# Patient Record
Sex: FEMALE | Race: WHITE | NOT HISPANIC OR LATINO | Employment: OTHER | URBAN - METROPOLITAN AREA
[De-identification: names, ages, dates, MRNs, and addresses within clinical notes are randomized per-mention and may not be internally consistent; named-entity substitution may affect disease eponyms.]

---

## 2017-01-02 ENCOUNTER — HOSPITAL ENCOUNTER (OUTPATIENT)
Dept: PHYSICAL THERAPY | Facility: HOSPITAL | Age: 52
Setting detail: THERAPIES SERIES
Discharge: HOME | End: 2017-01-02

## 2017-01-02 DIAGNOSIS — Z47.89 ORTHOPEDIC AFTERCARE: Primary | ICD-10-CM

## 2017-01-02 DIAGNOSIS — Z74.09 IMPAIRED MOBILITY: ICD-10-CM

## 2017-01-02 DIAGNOSIS — Z98.890 S/P ROTATOR CUFF REPAIR: ICD-10-CM

## 2017-01-02 PROCEDURE — 97110 THERAPEUTIC EXERCISES: CPT | Performed by: PHYSICAL THERAPIST

## 2017-01-02 PROCEDURE — 97140 MANUAL THERAPY 1/> REGIONS: CPT | Performed by: PHYSICAL THERAPIST

## 2017-01-02 NOTE — PROGRESS NOTES
Outpatient Physical Therapy Ortho Treatment Note  [facility]     Patient Name: Korina Raygoza  : 1965  MRN: [id number]  Today's Date: 2017      Visit Date: 2017    Visit Dx:    ICD-10-CM ICD-9-CM   1. Orthopedic aftercare Z47.89 V54.9   2. S/P rotator cuff repair Z98.890 V45.89   3. Impaired mobility Z74.09 799.89       Patient Active Problem List   Diagnosis   • Tear of medial meniscus of knee   • Osteoarthrosis, localized, primary, knee   • Knee pain, bilateral   • Tendonitis, Achilles, right   • Rotator cuff tendinitis   • Elbow laceration   • Complete tear of left rotator cuff        Past Medical History   Diagnosis Date   • Arthritis      KNEES   • Asthma    • History of herpes zoster    • Meniscus tear      LEFT - CURRENT   • Rosacea    • Rotator cuff tear         Past Surgical History   Procedure Laterality Date   • Mole removal  2013   • Knee surgery     • Shoulder arthroscopy w/ rotator cuff repair Left 10/18/2016     Procedure: SHOULDER ARTHROSCOPY WITH ROTATOR CUFF REPAIR AND LABRAL DEBRIDEMENT;  Surgeon: William Bowman MD;  Location: [facility] OR [facility];  Service:              PT Ortho       17 0800    Left Shoulder    Flexion PROM Deficit 165, supine   -GJ    ABduction PROM Deficit 170, supine   -GJ    External Rotation PROM Deficit 65 supine, POS, 90 abd  -GJ    Internal Rotation PROM Deficit 60, supine, POS, 90 abd   -GJ      User Key  (r) = Recorded By, (t) = Taken By, (c) = Cosigned By    Initials Name Provider Type    MERCY Henao PT Physical Therapist                            PT Assessment/Plan       17 0940          PT Assessment    Assessment Comments Ms. Raygoza returns following a one week vacation.  She is 11 weeks s/p L RC repair.  She reports decreased L shoulder pain following her week off.  She demonstrates good PROM (see specific joints), good scapulo/thoracic rhythm with AAROM activities in standing.  She is progressing well per protocol.     -GJ      PT Plan    PT Plan Comments continue to progress AAROM/AROM, as appropriate.  continue to work on rhythmic stabilization, possible slow reversal.    -GJ        User Key  (r) = Recorded By, (t) = Taken By, (c) = Cosigned By    Initials Name Provider Type    MERCY Henao, PT Physical Therapist                Modalities       01/02/17 0800          Ice    Ice Applied Yes  -GJ      Location L shoulder, pt. seated, LUE rested on pillows in lose pack  -GJ      Rx Minutes 10 mins  -GJ      Ice S/P Rx Yes  -GJ        User Key  (r) = Recorded By, (t) = Taken By, (c) = Cosigned By    Initials Name Provider Type    MERCY Henao, PT Physical Therapist                Exercises       01/02/17 0800          Subjective Comments    Subjective Comments still not sleeping well, and it's not all my shoulder, a lot of it is my legs. The rest on vacation helped my shoulder   -GJ      Subjective Pain    Pre-Treatment Pain Level 1  -GJ      Exercise 1    Exercise Name 1 shoulder shurgs  -GJ      Cueing 1 Verbal  -GJ      Equipment 1 Dumbell  -GJ      Weights/Plates 1 2  -GJ      Reps 1 20  -GJ      Exercise 2    Exercise Name 2 scap retraction, no shoulder ext  -GJ      Cueing 2 Tactile;Verbal;Demo;Auditory  -GJ      Equipment 2 Theraband  -GJ      Resistance 2 Red  -GJ      Reps 2 20  -GJ      Exercise 3    Exercise Name 3 elbow flexion/ext ROM   -GJ      Cueing 3 Verbal  -GJ      Equipment 3 Dumbell  -GJ      Weights/Plates 3 2  -GJ      Reps 3 20  -GJ      Exercise 6    Exercise Name 6 AAROM abduction with cane  -GJ      Cueing 6 Verbal  -GJ      Reps 6 20  -GJ      Exercise 7    Exercise Name 7 wash the wall, both hands  -GJ      Cueing 7 Verbal  -GJ      Reps 7 15  -GJ      Exercise 10    Exercise Name 10 FIR with towel  -GJ      Cueing 10 Verbal  -GJ      Reps 10 5  -GJ      Time (Seconds) 10 10  -GJ      Exercise 11    Exercise Name 11 prone shoulder ext  -GJ      Cueing 11 Verbal  -GJ      Equipment 11 Dumbell   -GJ      Weights/Plates 11 1  -GJ      Sets 11 2  -GJ      Reps 11 10  -GJ      Exercise 12    Exercise Name 12 supine horizontal abduction  -GJ      Cueing 12 Verbal  -GJ      Equipment 12 Theraband  -GJ      Resistance 12 Red  -GJ      Reps 12 15  -GJ      Exercise 13    Exercise Name 13 SL ER, towel roll   -GJ      Cueing 13 Tactile  -GJ      Equipment 13 Dumbell  -GJ      Weights/Plates 13 1  -GJ      Sets 13 2  -GJ      Reps 13 10  -GJ      Exercise 14    Exercise Name 14 prone row to neutral  -GJ      Cueing 14 Tactile  -GJ      Equipment 14 Dumbell  -GJ      Weights/Plates 14 1  -GJ      Reps 14 15  -GJ        User Key  (r) = Recorded By, (t) = Taken By, (c) = Cosigned By    Initials Name Provider Type    MERCY Henao, PT Physical Therapist                        Manual Rx (last 36 hours)      Manual Treatments       01/02/17 0700          Manual Rx 1    Manual Rx 1 Location gentle inferior mobs L shoulder/oscillations for relaxation, pt. supine  -GJ      Manual Rx 2    Manual Rx 2 Location PROM L shoulder, pt. supine, into flexion, abduction, ER, IR   -GJ      Manual Rx 3    Manual Rx 3 Location gentle rhythmic stabilization LUE, flexed to 90  -GJ      Manual Rx 4    Manual Rx 4 Location rhythmic stabilization of ER/IR, POS, pt. supine, varying angles of abduction.   -GJ        User Key  (r) = Recorded By, (t) = Taken By, (c) = Cosigned By    Initials Name Provider Type    MERCY Henao, PT Physical Therapist                PT OP Goals       01/02/17 0800          PT Short Term Goals    STG Date to Achieve 11/29/16  -GJ      STG 1 pt. to be I with initial HEP to facilitate self management of their condition  -GJ      STG 1 Progress Met  -GJ      STG 2 pt. to be educated in/verbalize understanding of the importance of posture/ergonomics in association with their condition to facilitate self management of their condition  -GJ      STG 2 Progress Met  -GJ      STG 3 pt. to demonstrate L shoulder  flexion PROM >/= 0-120 to facilitate ease of performing self care activities  -GJ      STG 3 Progress Met  -GJ      STG 4 pt. to demonstrate L shoulder PROM ER >/= 0-45 to facilitate ease of performing self care activities  -GJ      STG 4 Progress Met  -GJ      STG 5 pt. to report sleeping through the night without interruption secondary to her L shoulder pain to facilitate optimal healing   -GJ      STG 5 Progress Ongoing  -GJ      STG 5 Progress Comments not sleeping through the night  -GJ      Long Term Goals    LTG Date to Achieve 01/11/17  -GJ      LTG 1 pt. to be I with advanced HEP to facilitate self management of their condition  -GJ      LTG 1 Progress Ongoing  -GJ      LTG 2 pt. to report a DASH </= 20% to demonstrate decreased level of perceived disability  -GJ      LTG 2 Progress Ongoing  -GJ      LTG 3 pt. to demonstrate L shoulder AROM FIR >/= mid line belt line to facilitate ease of performing self care/dressing activities  -GJ      LTG 3 Progress Ongoing  -GJ      LTG 4 pt. to demonstrate L shoulder flexion AROM >/= 0-120 to facilitate ease of performing functional/household activities  -GJ      LTG 4 Progress Ongoing  -GJ      LTG 5 pt. to demonstrate placing/retrieving small object from an above the shoulder level shelf with her LUE to facilitate ease of performing household/work related activities  -GJ      LTG 5 Progress Ongoing  -GJ        User Key  (r) = Recorded By, (t) = Taken By, (c) = Cosigned By    Initials Name Provider Type    MERCY Henao, PT Physical Therapist                Therapy Education       01/02/17 0832    Therapy Education    Given HEP;Symptoms/condition management;Posture/body mechanics;Pain management;Mobility training  -GJ    Program New  -GJ    How Provided Verbal;Demonstration;Written  -GJ    Provided to Patient  -GJ    Level of Understanding Verbalized;Teach back education performed;Demonstrated  -GJ      User Key  (r) = Recorded By, (t) = Taken By, (c) = Cosigned  By    Initials Name Provider Type    GJ Gerard Henao, PT Physical Therapist                Time Calculation:   Start Time: 0829  Stop Time: 0925  Time Calculation (min): 56 min    Therapy Charges for Today     Code Description Service Date Service Provider Modifiers Qty    80704815837 HC PT HOT OR COLD PACK TREAT MCARE 1/2/2017 Gerard Henao, PT GP 1    75472769152 HC PT MANUAL THERAPY EA 15 MIN 1/2/2017 Gerard Henao, PT GP 1    75902383112 HC PT THER PROC EA 15 MIN 1/2/2017 Gerard Henao, PT GP 2                    Gerard Henao, PT  1/2/2017

## 2017-01-04 ENCOUNTER — HOSPITAL ENCOUNTER (OUTPATIENT)
Dept: PHYSICAL THERAPY | Facility: HOSPITAL | Age: 52
Setting detail: THERAPIES SERIES
Discharge: HOME | End: 2017-01-04

## 2017-01-04 DIAGNOSIS — Z74.09 IMPAIRED MOBILITY: ICD-10-CM

## 2017-01-04 DIAGNOSIS — M25.661 LIMITATION OF JOINT MOTION OF KNEE, RIGHT: ICD-10-CM

## 2017-01-04 DIAGNOSIS — M17.0 PRIMARY OSTEOARTHRITIS OF BOTH KNEES: ICD-10-CM

## 2017-01-04 DIAGNOSIS — M25.662 KNEE STIFFNESS, LEFT: ICD-10-CM

## 2017-01-04 DIAGNOSIS — Z47.89 ORTHOPEDIC AFTERCARE: Primary | ICD-10-CM

## 2017-01-04 DIAGNOSIS — Z98.890 S/P ROTATOR CUFF REPAIR: ICD-10-CM

## 2017-01-04 PROCEDURE — 97140 MANUAL THERAPY 1/> REGIONS: CPT

## 2017-01-04 PROCEDURE — 97110 THERAPEUTIC EXERCISES: CPT

## 2017-01-04 NOTE — PROGRESS NOTES
Outpatient Physical Therapy Ortho Treatment Note  [facility]     Patient Name: Korina Raygoza  : 1965  MRN: [id number]  Today's Date: 2017      Visit Date: 2017    Visit Dx:    ICD-10-CM ICD-9-CM   1. Orthopedic aftercare Z47.89 V54.9   2. S/P rotator cuff repair Z98.890 V45.89   3. Impaired mobility Z74.09 799.89   4. Knee stiffness, left M25.662 719.56   5. Limitation of joint motion of knee, right M25.661 719.56   6. Primary osteoarthritis of both knees M17.0 715.16       Patient Active Problem List   Diagnosis   • Tear of medial meniscus of knee   • Osteoarthrosis, localized, primary, knee   • Knee pain, bilateral   • Tendonitis, Achilles, right   • Rotator cuff tendinitis   • Elbow laceration   • Complete tear of left rotator cuff        Past Medical History   Diagnosis Date   • Arthritis      KNEES   • Asthma    • History of herpes zoster    • Meniscus tear      LEFT - CURRENT   • Rosacea    • Rotator cuff tear         Past Surgical History   Procedure Laterality Date   • Mole removal  2013   • Knee surgery     • Shoulder arthroscopy w/ rotator cuff repair Left 10/18/2016     Procedure: SHOULDER ARTHROSCOPY WITH ROTATOR CUFF REPAIR AND LABRAL DEBRIDEMENT;  Surgeon: William Bowman MD;  Location: [facility] OR [facility];  Service:              PT Ortho       17 0800    Left Shoulder    Flexion PROM Deficit 165, supine   -GJ    ABduction PROM Deficit 170, supine   -GJ    External Rotation PROM Deficit 65 supine, POS, 90 abd  -GJ    Internal Rotation PROM Deficit 60, supine, POS, 90 abd   -GJ      User Key  (r) = Recorded By, (t) = Taken By, (c) = Cosigned By    Initials Name Provider Type    MERCY Henao PT Physical Therapist                            PT Assessment/Plan       17 0925 17 0940       PT Assessment    Assessment Comments Pain is well controlled. Patient has good PROM. Continue work on functioanl strengthening  -WS Ms. Raygoza returns following a one week  vacation.  She is 11 weeks s/p L RC repair.  She reports decreased L shoulder pain following her week off.  She demonstrates good PROM (see specific joints), good scapulo/thoracic rhythm with AAROM activities in standing.  She is progressing well per protocol.    -GJ     PT Plan    PT Plan Comments  continue to progress AAROM/AROM, as appropriate.  continue to work on rhythmic stabilization, possible slow reversal.    -GJ       User Key  (r) = Recorded By, (t) = Taken By, (c) = Cosigned By    Initials Name Provider Type    MERCY Henao, PT Physical Therapist    WS José Miguel Chairez PTA Physical Therapy Assistant                Modalities       01/04/17 0900          Ice    Location L shoulder, pt. seated, LUE rested on pillows in lose pack  -WS      Rx Minutes 10 mins  -WS      Ice S/P Rx Yes  -WS        User Key  (r) = Recorded By, (t) = Taken By, (c) = Cosigned By    Initials Name Provider Type    ABISAI Chairez PTA Physical Therapy Assistant                Exercises       01/04/17 0845          Subjective Comments    Subjective Comments Pain is low  -WS      Subjective Pain    Able to rate subjective pain? yes  -WS      Pre-Treatment Pain Level 1  -WS      Exercise 1    Exercise Name 1 shoulder shurgs  -WS      Cueing 1 Verbal  -WS      Equipment 1 Dumbell  -WS      Weights/Plates 1 2  -WS      Sets 1 2  -WS      Reps 1 20  -WS      Time (Seconds) 1 5  -WS      Exercise 2    Exercise Name 2 scap retraction, no shoulder ext  -WS      Cueing 2 Tactile;Verbal;Demo;Auditory  -WS      Equipment 2 Theraband  -WS      Resistance 2 Red  -WS      Reps 2 20  -WS      Time (Seconds) 2 30  -WS      Exercise 3    Exercise Name 3 elbow flexion/ext ROM   -WS      Cueing 3 Verbal  -WS      Equipment 3 Dumbell  -WS      Weights/Plates 3 2  -WS      Reps 3 20  -WS      Exercise 6    Exercise Name 6 AAROM abduction with cane  -WS      Cueing 6 Verbal  -WS      Reps 6 20  -WS      Exercise 7    Exercise Name 7 wash the  wall, both hands  -WS      Cueing 7 Verbal  -WS      Sets 7 1  -WS      Reps 7 15  -WS      Time (Seconds) 7 5  -WS      Exercise 8    Exercise Name 8 chest press, cane, supine  -WS      Cueing 8 Verbal  -WS      Equipment 8 Cuff Weight  -WS      Weights/Plates 8 5  -WS      Sets 8 1  -WS      Reps 8 15  -WS      Exercise 10    Exercise Name 10 FIR with towel  -WS      Cueing 10 Verbal  -WS      Reps 10 5  -WS      Time (Seconds) 10 10  -WS      Exercise 11    Exercise Name 11 prone shoulder ext  -WS      Cueing 11 Verbal  -WS      Equipment 11 Dumbell  -WS      Weights/Plates 11 1  -WS      Sets 11 2  -WS      Reps 11 10  -WS      Exercise 12    Exercise Name 12 supine horizontal abduction  -WS      Cueing 12 Verbal  -WS      Equipment 12 Theraband  -WS      Resistance 12 Red  -WS      Reps 12 15  -WS      Exercise 13    Exercise Name 13 SL ER, towel roll   -WS      Cueing 13 Tactile  -WS      Equipment 13 Dumbell  -WS      Weights/Plates 13 1  -WS      Sets 13 2  -WS      Reps 13 10  -WS      Exercise 14    Exercise Name 14 prone row to neutral  -WS      Cueing 14 Tactile  -WS      Equipment 14 Dumbell  -WS      Weights/Plates 14 1  -WS      Reps 14 15  -WS        User Key  (r) = Recorded By, (t) = Taken By, (c) = Cosigned By    Initials Name Provider Type     José Miguel Chairez PTA Physical Therapy Assistant                        Manual Rx (last 36 hours)      Manual Treatments       01/04/17 0900          Manual Rx 1    Manual Rx 1 Location gentle inferior mobs L shoulder/oscillations for relaxation, pt. supine  -WS      Manual Rx 2    Manual Rx 2 Location PROM L shoulder, pt. supine, into flexion, abduction, ER, IR   -WS      Manual Rx 3    Manual Rx 3 Location gentle rhythmic stabilization LUE, flexed to 90  -WS      Manual Rx 4    Manual Rx 4 Location rhythmic stabilization of ER/IR, POS, pt. supine, varying angles of abduction.   -WS        User Key  (r) = Recorded By, (t) = Taken By, (c) = Cosigned By     Initials Name Provider Type    WS José Miguel Chairez, PTA Physical Therapy Assistant                PT OP Goals       01/04/17 0900 01/02/17 0800       PT Short Term Goals    STG Date to Achieve 11/29/16  -WS 11/29/16  -GJ     STG 1 pt. to be I with initial HEP to facilitate self management of their condition  -WS pt. to be I with initial HEP to facilitate self management of their condition  -GJ     STG 1 Progress Met  -WS Met  -GJ     STG 2 pt. to be educated in/verbalize understanding of the importance of posture/ergonomics in association with their condition to facilitate self management of their condition  -WS pt. to be educated in/verbalize understanding of the importance of posture/ergonomics in association with their condition to facilitate self management of their condition  -GJ     STG 2 Progress Met  -WS Met  -GJ     STG 3 pt. to demonstrate L shoulder flexion PROM >/= 0-120 to facilitate ease of performing self care activities  -WS pt. to demonstrate L shoulder flexion PROM >/= 0-120 to facilitate ease of performing self care activities  -GJ     STG 3 Progress Met  -WS Met  -GJ     STG 4 pt. to demonstrate L shoulder PROM ER >/= 0-45 to facilitate ease of performing self care activities  -WS pt. to demonstrate L shoulder PROM ER >/= 0-45 to facilitate ease of performing self care activities  -GJ     STG 4 Progress Met  -WS Met  -GJ     STG 5 pt. to report sleeping through the night without interruption secondary to her L shoulder pain to facilitate optimal healing   -WS pt. to report sleeping through the night without interruption secondary to her L shoulder pain to facilitate optimal healing   -GJ     STG 5 Progress Ongoing  -WS Ongoing  -GJ     STG 5 Progress Comments  not sleeping through the night  -GJ     Long Term Goals    LTG Date to Achieve 01/11/17  -WS 01/11/17  -GJ     LTG 1 pt. to be I with advanced HEP to facilitate self management of their condition  -WS pt. to be I with advanced HEP to  facilitate self management of their condition  -GJ     LTG 1 Progress Ongoing  -WS Ongoing  -GJ     LTG 2 pt. to report a DASH </= 20% to demonstrate decreased level of perceived disability  -WS pt. to report a DASH </= 20% to demonstrate decreased level of perceived disability  -GJ     LTG 2 Progress Ongoing  -WS Ongoing  -GJ     LTG 3 pt. to demonstrate L shoulder AROM FIR >/= mid line belt line to facilitate ease of performing self care/dressing activities  -WS pt. to demonstrate L shoulder AROM FIR >/= mid line belt line to facilitate ease of performing self care/dressing activities  -GJ     LTG 3 Progress Ongoing  -WS Ongoing  -GJ     LTG 4 pt. to demonstrate L shoulder flexion AROM >/= 0-120 to facilitate ease of performing functional/household activities  -WS pt. to demonstrate L shoulder flexion AROM >/= 0-120 to facilitate ease of performing functional/household activities  -GJ     LTG 4 Progress Ongoing  -WS Ongoing  -GJ     LTG 5 pt. to demonstrate placing/retrieving small object from an above the shoulder level shelf with her LUE to facilitate ease of performing household/work related activities  -WS pt. to demonstrate placing/retrieving small object from an above the shoulder level shelf with her LUE to facilitate ease of performing household/work related activities  -GJ     LTG 5 Progress Ongoing  -WS Ongoing  -GJ     LTG 6 pt. to report initiating personal fitness program at [facility] to faciliate optimal carry over and improved overall health  -WS      LTG 6 Progress Met  -WS        User Key  (r) = Recorded By, (t) = Taken By, (c) = Cosigned By    Initials Name Provider Type    MERCY Henao, PT Physical Therapist    ABISAI Chairez PTA Physical Therapy Assistant                Therapy Education       01/04/17 0933    Therapy Education    Given HEP  -WS    Program Reinforced  -WS    How Provided Verbal  -WS    Provided to Patient  -WS      01/02/17 0832    Therapy Education    Given  HEP;Symptoms/condition management;Posture/body mechanics;Pain management;Mobility training  -GJ    Program New  -GJ    How Provided Verbal;Demonstration;Written  -GJ    Provided to Patient  -GJ    Level of Understanding Verbalized;Teach back education performed;Demonstrated  -GJ      User Key  (r) = Recorded By, (t) = Taken By, (c) = Cosigned By    Initials Name Provider Type    MERCY Henao, PT Physical Therapist    ABISAI Chairez PTA Physical Therapy Assistant                Time Calculation:   Start Time: 0845  Stop Time: 0945  Time Calculation (min): 60 min    Therapy Charges for Today     Code Description Service Date Service Provider Modifiers Qty    86642585630 HC PT THER PROC EA 15 MIN 1/4/2017 José Miguel Chairez PTA GP 2    93199624276 HC PT HOT OR COLD PACK TREAT MCARE 1/4/2017 José Miguel Chairez PTA GP 1    83499066371 HC PT MANUAL THERAPY EA 15 MIN 1/4/2017 José Miguel Chairez PTA GP 1                    José Miguel Chairez PTA  1/4/2017

## 2017-01-09 ENCOUNTER — APPOINTMENT (OUTPATIENT)
Dept: PHYSICAL THERAPY | Facility: HOSPITAL | Age: 52
End: 2017-01-09

## 2017-01-11 ENCOUNTER — HOSPITAL ENCOUNTER (OUTPATIENT)
Dept: PHYSICAL THERAPY | Facility: HOSPITAL | Age: 52
Setting detail: THERAPIES SERIES
Discharge: HOME | End: 2017-01-11

## 2017-01-11 ENCOUNTER — OFFICE VISIT (OUTPATIENT)
Dept: ORTHOPEDIC SURGERY | Facility: CLINIC | Age: 52
End: 2017-01-11

## 2017-01-11 VITALS — BODY MASS INDEX: 33.74 KG/M2 | WEIGHT: 215 LBS | HEIGHT: 67 IN | TEMPERATURE: 98.2 F

## 2017-01-11 DIAGNOSIS — Z74.09 IMPAIRED MOBILITY: ICD-10-CM

## 2017-01-11 DIAGNOSIS — M17.0 PRIMARY OSTEOARTHRITIS OF BOTH KNEES: ICD-10-CM

## 2017-01-11 DIAGNOSIS — Z47.89 ORTHOPEDIC AFTERCARE: Primary | ICD-10-CM

## 2017-01-11 DIAGNOSIS — M25.661 LIMITATION OF JOINT MOTION OF KNEE, RIGHT: ICD-10-CM

## 2017-01-11 DIAGNOSIS — Z98.890 S/P ROTATOR CUFF REPAIR: Primary | ICD-10-CM

## 2017-01-11 DIAGNOSIS — M25.662 KNEE STIFFNESS, LEFT: ICD-10-CM

## 2017-01-11 DIAGNOSIS — Z98.890 S/P ROTATOR CUFF REPAIR: ICD-10-CM

## 2017-01-11 PROCEDURE — 97140 MANUAL THERAPY 1/> REGIONS: CPT

## 2017-01-11 PROCEDURE — 97110 THERAPEUTIC EXERCISES: CPT

## 2017-01-11 PROCEDURE — 99024 POSTOP FOLLOW-UP VISIT: CPT | Performed by: ORTHOPAEDIC SURGERY

## 2017-01-11 RX ORDER — OXYCODONE AND ACETAMINOPHEN 7.5; 325 MG/1; MG/1
1 TABLET ORAL EVERY 8 HOURS PRN
Qty: 50 TABLET | Refills: 0 | Status: SHIPPED | OUTPATIENT
Start: 2017-01-11 | End: 2017-04-14

## 2017-01-11 NOTE — MR AVS SNAPSHOT
Korina Raygoza   1/11/2017 8:00 AM   Office Visit    Dept Phone:  [phone]   Encounter #:  [id number]    Provider:  William Bowman MD   Department:  [facility]                Your Full Care Plan              Today's Medication Changes          These changes are accurate as of: 1/11/17  8:37 AM.  If you have any questions, ask your nurse or doctor.               Medication(s)that have changed:     promethazine 12.5 MG tablet   Commonly known as:  PHENERGAN   Take 1 tablet by mouth Every 6 (Six) Hours As Needed for nausea or vomiting.   What changed:  Another medication with the same name was removed. Continue taking this medication, and follow the directions you see here.   Changed by:  William Bowman MD            Where to Get Your Medications      You can get these medications from any pharmacy     Bring a paper prescription for each of these medications     oxyCODONE-acetaminophen 7.5-325 MG per tablet                  Your Updated Medication List          This list is accurate as of: 1/11/17  8:37 AM.  Always use your most recent med list.                albuterol 108 (90 BASE) MCG/ACT inhaler   Commonly known as:  PROVENTIL HFA;VENTOLIN HFA       metroNIDAZOLE 0.75 % cream   Commonly known as:  METROCREAM       MULTIVITAMIN ADULT PO       ondansetron ODT 4 MG disintegrating tablet   Commonly known as:  ZOFRAN ODT   Take 1 tablet by mouth every 8 (eight) hours as needed for nausea or vomiting.       * oxyCODONE-acetaminophen 7.5-325 MG per tablet   Commonly known as:  PERCOCET   Take 1 tablet by mouth Every 4 (Four) Hours As Needed for moderate pain (4-6).       * oxyCODONE-acetaminophen 7.5-325 MG per tablet   Commonly known as:  PERCOCET   Take 1 tablet by mouth Every 8 (Eight) Hours As Needed for moderate pain (4-6).       promethazine 12.5 MG tablet   Commonly known as:  PHENERGAN   Take 1 tablet by mouth Every 6 (Six)  Hours As Needed for nausea or vomiting.       valACYclovir 500 MG tablet   Commonly known as:  VALTREX       vitamin D 00652 UNITS capsule capsule   Commonly known as:  ERGOCALCIFEROL       * Notice:  This list has 2 medication(s) that are the same as other medications prescribed for you. Read the directions carefully, and ask your doctor or other care provider to review them with you.            You Were Diagnosed With        Codes Comments    S/P rotator cuff repair    -  Primary ICD-10-CM: Z98.890  ICD-9-CM: V45.89       Instructions     None    Patient Instructions History      Upcoming Appointments     Visit Type Date Time Department    FOLLOW UP 1/11/2017  8:00 AM [facility]    TREATMENT 1/11/2017  9:00 AM [facility]    TREATMENT 1/16/2017  8:00 AM  [facility]    FOLLOW UP 2/17/2017 10:50 AM [facility]      MyChart Signup     Our records indicate that you have an active [employer] account.    You can view your After Visit Summary by going to [employer] and logging in with your [employer] username and password.  If you don't have a [employer] username and password but a parent or guardian has access to your record, the parent or guardian should login with their own [employer] username and password and access your record to view the After Visit Summary.    If you have questions, you can email [email or web address] or call [phone] to talk to our [employer] staff.  Remember, [employer] is NOT to be used for urgent needs.  For medical emergencies, dial 911.               Other Info from Your Visit           Your Appointments     Jan 11, 2017  9:00 AM EST   Therapy Treatment with José Miguel Chairez [facility] ([facility]    [address] [facility] [address]   [phone]            Jan 16, 2017  8:00 AM EST   Therapy Treatment with José Miguel Chairez PTA   [facility] ([place])  "   [address] [facility] [address]   [phone]            Feb 17, 2017 10:50 AM EST   Follow Up with William Bowman MD   [facility] (--)    [address] [facility] [address]  [facility] [address]   [phone]           Arrive 15 minutes prior to appointment.              Allergies     Codeine  Nausea Only      Reason for Visit     Left Shoulder - Follow-up, Pain           Vital Signs     Temperature Height Weight Body Mass Index Smoking Status       98.2 °F (36.8 °C) 67\" (170.2 cm) 215 lb (97.5 kg) 33.67 kg/m2 Never Smoker       Problems and Diagnoses Noted     S/P rotator cuff repair    -  Primary        "

## 2017-01-11 NOTE — PROGRESS NOTES
Korina Raygoza : 1965 MRN: [id number] DATE: 2017      CC: 3 months s/p left shoulder rotator cuff repair    HPI: Pt. returns to clinic today stating pain is improved.  Still has soreness but it's slowly getting better.  Motion is progressing.  Denies any new concerns or issues.  PT is helping.    Vitals:    17 0804   Temp: 98.2 °F (36.8 °C)       Current Outpatient Prescriptions:   •  albuterol (PROVENTIL HFA;VENTOLIN HFA) 108 (90 BASE) MCG/ACT inhaler, Inhale 2 puffs Every 6 (Six) Hours As Needed for wheezing or shortness of air., Disp: , Rfl:   •  metroNIDAZOLE (METROCREAM) 0.75 % cream, Apply  topically Daily As Needed., Disp: , Rfl:   •  Multiple Vitamins-Minerals (MULTIVITAMIN ADULT PO), Take 1 tablet by mouth Daily., Disp: , Rfl:   •  ondansetron ODT (ZOFRAN ODT) 4 MG disintegrating tablet, Take 1 tablet by mouth every 8 (eight) hours as needed for nausea or vomiting., Disp: 10 tablet, Rfl: 0  •  oxyCODONE-acetaminophen (PERCOCET) 7.5-325 MG per tablet, Take 1 tablet by mouth Every 4 (Four) Hours As Needed for moderate pain (4-6)., Disp: 50 tablet, Rfl: 0  •  promethazine (PHENERGAN) 12.5 MG tablet, Take 1 tablet by mouth Every 6 (Six) Hours As Needed for nausea or vomiting., Disp: 30 tablet, Rfl: 0  •  valACYclovir (VALTREX) 500 MG tablet, Take 500 mg by mouth As Needed., Disp: , Rfl:   •  vitamin D (ERGOCALCIFEROL) 11838 UNITS capsule capsule, Take 50,000 Units by mouth Every 7 (Seven) Days., Disp: , Rfl:     Past Medical History   Diagnosis Date   • Arthritis      KNEES   • Asthma    • History of herpes zoster    • Meniscus tear      LEFT - CURRENT   • Rosacea    • Rotator cuff tear        Past Surgical History   Procedure Laterality Date   • Mole removal  2013   • Knee surgery     • Shoulder arthroscopy w/ rotator cuff repair Left 10/18/2016     Procedure: SHOULDER ARTHROSCOPY WITH ROTATOR CUFF REPAIR AND LABRAL DEBRIDEMENT;  Surgeon: William Bowman MD;  Location: [facility] OR [facility] [place];   Service:        Family History   Problem Relation Age of Onset   • Hypertension Other    • Bleeding Disorder Other    • Lung cancer Other    • Heart disease Other    • Diabetes Other        Social History     Social History   • Marital status:      Spouse name: N/A   • Number of children: N/A   • Years of education: N/A     Occupational History   • Not on file.     Social History Main Topics   • Smoking status: Never Smoker   • Smokeless tobacco: Never Used   • Alcohol use No   • Drug use: No   • Sexual activity: No     Other Topics Concern   • Not on file     Social History Narrative     Exam:  Contour of shoulder appears normal.  Skin intact and benign.  Arm and forearm soft.  Motion is excellent--still lacking several levels of IR.  Good motor and sensory function distally.  Palpable pulses with good cap refill.      Imaging:  none    Impression:  3 months s/p left shoulder rotator cuff repair    Plan:    1.  Progress ROM and strengthening as tolerated.  2.  Continue PT per protocol.  3.  F/u in 6 weeks.  4.  I have agreed to refill her pain medication.  The risks were discussed in detail.

## 2017-01-11 NOTE — PROGRESS NOTES
Outpatient Physical Therapy Ortho Treatment Note  [facility]     Patient Name: Korina Raygoza  : 1965  MRN: [id number]  Today's Date: 2017      Visit Date: 2017    Visit Dx:    ICD-10-CM ICD-9-CM   1. Orthopedic aftercare Z47.89 V54.9   2. S/P rotator cuff repair Z98.890 V45.89   3. Impaired mobility Z74.09 799.89   4. Knee stiffness, left M25.662 719.56   5. Limitation of joint motion of knee, right M25.661 719.56   6. Primary osteoarthritis of both knees M17.0 715.16       Patient Active Problem List   Diagnosis   • Tear of medial meniscus of knee   • Osteoarthrosis, localized, primary, knee   • Knee pain, bilateral   • Tendonitis, Achilles, right   • Rotator cuff tendinitis   • Elbow laceration   • Complete tear of left rotator cuff        Past Medical History   Diagnosis Date   • Arthritis      KNEES   • Asthma    • History of herpes zoster    • Meniscus tear      LEFT - CURRENT   • Rosacea    • Rotator cuff tear         Past Surgical History   Procedure Laterality Date   • Mole removal  2013   • Knee surgery     • Shoulder arthroscopy w/ rotator cuff repair Left 10/18/2016     Procedure: SHOULDER ARTHROSCOPY WITH ROTATOR CUFF REPAIR AND LABRAL DEBRIDEMENT;  Surgeon: William Bowman MD;  Location: [facility] OR [facility] [place];  Service:                              PT Assessment/Plan       17 0938          PT Assessment    Assessment Comments Progressing well with exercises. Increased weight with S/L ER  -WS        User Key  (r) = Recorded By, (t) = Taken By, (c) = Cosigned By    Initials Name Provider Type    ABISAI Chairez PTA Physical Therapy Assistant                    Exercises       17 0845          Subjective Comments    Subjective Comments MD visit went well  -WS      Subjective Pain    Able to rate subjective pain? yes  -WS      Pre-Treatment Pain Level 1  -WS      Exercise 1    Exercise Name 1 shoulder shurgs  -WS      Cueing 1 Verbal  -WS      Equipment 1 Dumbell   -WS      Weights/Plates 1 2  -WS      Reps 1 20  -WS      Exercise 2    Exercise Name 2 scap retraction, no shoulder ext  -WS      Cueing 2 Tactile;Verbal;Demo;Auditory  -WS      Equipment 2 Theraband  -WS      Resistance 2 Red  -WS      Reps 2 20  -WS      Exercise 3    Exercise Name 3 elbow flexion/ext ROM   -WS      Cueing 3 Verbal  -WS      Equipment 3 Dumbell  -WS      Weights/Plates 3 2  -WS      Reps 3 20  -WS      Exercise 7    Exercise Name 7 wash the wall, both hands  -WS      Cueing 7 Verbal  -WS      Sets 7 1  -WS      Reps 7 15  -WS      Exercise 8    Exercise Name 8 chest press, cane, supine  -WS      Cueing 8 Verbal  -WS      Equipment 8 Cuff Weight  -WS      Weights/Plates 8 5  -WS      Sets 8 1  -WS      Reps 8 15  -WS      Exercise 10    Exercise Name 10 FIR with towel  -WS      Cueing 10 Verbal  -WS      Sets 10 1  -WS      Reps 10 5  -WS      Time (Seconds) 10 10  -WS      Exercise 11    Exercise Name 11 prone shoulder ext  -WS      Cueing 11 Verbal  -WS      Equipment 11 Dumbell  -WS      Weights/Plates 11 1  -WS      Sets 11 2  -WS      Reps 11 10  -WS      Exercise 12    Exercise Name 12 supine horizontal abduction  -WS      Cueing 12 Verbal  -WS      Equipment 12 Theraband  -WS      Resistance 12 Red  -WS      Reps 12 15  -WS      Exercise 13    Exercise Name 13 SL ER, towel roll   -WS      Cueing 13 Tactile  -WS      Equipment 13 Dumbell  -WS      Weights/Plates 13 2 Plates  -WS      Sets 13 2  -WS      Reps 13 10  -WS      Exercise 14    Exercise Name 14 prone row to neutral  -WS      Cueing 14 Tactile  -WS      Equipment 14 Dumbell  -WS      Weights/Plates 14 1  -WS      Reps 14 15  -WS        User Key  (r) = Recorded By, (t) = Taken By, (c) = Cosigned By    Initials Name Provider Type    ABISAI Chairez PTA Physical Therapy Assistant                               PT OP Goals       01/11/17 0900 01/04/17 0900 01/02/17 0800    PT Short Term Goals    STG Date to Achieve 11/29/16  -WS  11/29/16  -WS 11/29/16  -GJ    STG 1 pt. to be I with initial HEP to facilitate self management of their condition  -WS pt. to be I with initial HEP to facilitate self management of their condition  -WS pt. to be I with initial HEP to facilitate self management of their condition  -GJ    STG 1 Progress Met  -WS Met  -WS Met  -GJ    STG 2 pt. to be educated in/verbalize understanding of the importance of posture/ergonomics in association with their condition to facilitate self management of their condition  -WS pt. to be educated in/verbalize understanding of the importance of posture/ergonomics in association with their condition to facilitate self management of their condition  -WS pt. to be educated in/verbalize understanding of the importance of posture/ergonomics in association with their condition to facilitate self management of their condition  -GJ    STG 2 Progress Met  -WS Met  -WS Met  -GJ    STG 3 pt. to demonstrate L shoulder flexion PROM >/= 0-120 to facilitate ease of performing self care activities  -WS pt. to demonstrate L shoulder flexion PROM >/= 0-120 to facilitate ease of performing self care activities  -WS pt. to demonstrate L shoulder flexion PROM >/= 0-120 to facilitate ease of performing self care activities  -GJ    STG 3 Progress Met  -WS Met  -WS Met  -GJ    STG 4 pt. to demonstrate L shoulder PROM ER >/= 0-45 to facilitate ease of performing self care activities  -WS pt. to demonstrate L shoulder PROM ER >/= 0-45 to facilitate ease of performing self care activities  -WS pt. to demonstrate L shoulder PROM ER >/= 0-45 to facilitate ease of performing self care activities  -GJ    STG 4 Progress Met  -WS Met  -WS Met  -GJ    STG 5 pt. to report sleeping through the night without interruption secondary to her L shoulder pain to facilitate optimal healing   -WS pt. to report sleeping through the night without interruption secondary to her L shoulder pain to facilitate optimal healing   -WS pt.  to report sleeping through the night without interruption secondary to her L shoulder pain to facilitate optimal healing   -GJ    STG 5 Progress Ongoing  -WS Ongoing  -WS Ongoing  -GJ    STG 5 Progress Comments   not sleeping through the night  -GJ    Long Term Goals    LTG Date to Achieve 01/11/17  -WS 01/11/17  -WS 01/11/17  -GJ    LTG 1 pt. to be I with advanced HEP to facilitate self management of their condition  -WS pt. to be I with advanced HEP to facilitate self management of their condition  -WS pt. to be I with advanced HEP to facilitate self management of their condition  -GJ    LTG 1 Progress Ongoing  -WS Ongoing  -WS Ongoing  -GJ    LTG 2 pt. to report a DASH </= 20% to demonstrate decreased level of perceived disability  -WS pt. to report a DASH </= 20% to demonstrate decreased level of perceived disability  -WS pt. to report a DASH </= 20% to demonstrate decreased level of perceived disability  -GJ    LTG 2 Progress Ongoing  -WS Ongoing  -WS Ongoing  -GJ    LTG 3 pt. to demonstrate L shoulder AROM FIR >/= mid line belt line to facilitate ease of performing self care/dressing activities  -WS pt. to demonstrate L shoulder AROM FIR >/= mid line belt line to facilitate ease of performing self care/dressing activities  -WS pt. to demonstrate L shoulder AROM FIR >/= mid line belt line to facilitate ease of performing self care/dressing activities  -GJ    LTG 3 Progress Ongoing  -WS Ongoing  -WS Ongoing  -GJ    LTG 4 pt. to demonstrate L shoulder flexion AROM >/= 0-120 to facilitate ease of performing functional/household activities  -WS pt. to demonstrate L shoulder flexion AROM >/= 0-120 to facilitate ease of performing functional/household activities  -WS pt. to demonstrate L shoulder flexion AROM >/= 0-120 to facilitate ease of performing functional/household activities  -GJ    LTG 4 Progress Ongoing  -WS Ongoing  -WS Ongoing  -GJ    LTG 5 pt. to demonstrate placing/retrieving small object from an above  the shoulder level shelf with her LUE to facilitate ease of performing household/work related activities  -WS pt. to demonstrate placing/retrieving small object from an above the shoulder level shelf with her LUE to facilitate ease of performing household/work related activities  -WS pt. to demonstrate placing/retrieving small object from an above the shoulder level shelf with her LUE to facilitate ease of performing household/work related activities  -GJ    LTG 5 Progress Ongoing  -WS Ongoing  -WS Ongoing  -GJ    LTG 6 pt. to report initiating personal fitness program at [facility] to faciliate optimal carry over and improved overall health  -WS pt. to report initiating personal fitness program at [facility] to faciliate optimal carry over and improved overall health  -WS     LTG 6 Progress Met  -WS Met  -WS       User Key  (r) = Recorded By, (t) = Taken By, (c) = Cosigned By    Initials Name Provider Type    MERCY Henao, PT Physical Therapist    ABISAI Chairez PTA Physical Therapy Assistant                Therapy Education       01/11/17 0936    Therapy Education    Given Posture/body mechanics;HEP  -WS    Program Reinforced  -WS    How Provided Verbal  -WS    Provided to Patient  -WS      User Key  (r) = Recorded By, (t) = Taken By, (c) = Cosigned By    Initials Name Provider Type    ABISAI Chairez PTA Physical Therapy Assistant                Time Calculation:   Start Time: 0845  Stop Time: 0945  Time Calculation (min): 60 min    Therapy Charges for Today     Code Description Service Date Service Provider Modifiers Qty    95721573658 HC PT THER PROC EA 15 MIN 1/11/2017 José Miguel Cahirez PTA GP 2    34030719093 HC PT MANUAL THERAPY EA 15 MIN 1/11/2017 José Miguel Chairez PTA GP 1    72679327388 HC PT HOT OR COLD PACK TREAT [facility] 1/11/2017 José Miguel Chairez PTA GP 1                    José Miguel Chairez PTA  1/11/2017

## 2017-01-13 ENCOUNTER — APPOINTMENT (OUTPATIENT)
Dept: PHYSICAL THERAPY | Facility: HOSPITAL | Age: 52
End: 2017-01-13

## 2017-01-16 ENCOUNTER — HOSPITAL ENCOUNTER (OUTPATIENT)
Dept: PHYSICAL THERAPY | Facility: HOSPITAL | Age: 52
Setting detail: THERAPIES SERIES
Discharge: HOME | End: 2017-01-16

## 2017-01-16 DIAGNOSIS — Z47.89 ORTHOPEDIC AFTERCARE: Primary | ICD-10-CM

## 2017-01-16 DIAGNOSIS — Z98.890 S/P ROTATOR CUFF REPAIR: ICD-10-CM

## 2017-01-16 DIAGNOSIS — M25.662 KNEE STIFFNESS, LEFT: ICD-10-CM

## 2017-01-16 DIAGNOSIS — M25.661 LIMITATION OF JOINT MOTION OF KNEE, RIGHT: ICD-10-CM

## 2017-01-16 DIAGNOSIS — M17.0 PRIMARY OSTEOARTHRITIS OF BOTH KNEES: ICD-10-CM

## 2017-01-16 DIAGNOSIS — Z74.09 IMPAIRED MOBILITY: ICD-10-CM

## 2017-01-16 PROCEDURE — 97110 THERAPEUTIC EXERCISES: CPT

## 2017-01-16 PROCEDURE — 97140 MANUAL THERAPY 1/> REGIONS: CPT

## 2017-01-16 NOTE — PROGRESS NOTES
Outpatient Physical Therapy Ortho Treatment Note  [facility]     Patient Name: Korina Raygoza  : 1965  MRN: [id number]  Today's Date: 2017      Visit Date: 2017    Visit Dx:    ICD-10-CM ICD-9-CM   1. Orthopedic aftercare Z47.89 V54.9   2. S/P rotator cuff repair Z98.890 V45.89   3. Impaired mobility Z74.09 799.89   4. Knee stiffness, left M25.662 719.56   5. Limitation of joint motion of knee, right M25.661 719.56   6. Primary osteoarthritis of both knees M17.0 715.16       Patient Active Problem List   Diagnosis   • Tear of medial meniscus of knee   • Osteoarthrosis, localized, primary, knee   • Knee pain, bilateral   • Tendonitis, Achilles, right   • Rotator cuff tendinitis   • Elbow laceration   • Complete tear of left rotator cuff        Past Medical History   Diagnosis Date   • Arthritis      KNEES   • Asthma    • History of herpes zoster    • Meniscus tear      LEFT - CURRENT   • Rosacea    • Rotator cuff tear         Past Surgical History   Procedure Laterality Date   • Mole removal  2013   • Knee surgery     • Shoulder arthroscopy w/ rotator cuff repair Left 10/18/2016     Procedure: SHOULDER ARTHROSCOPY WITH ROTATOR CUFF REPAIR AND LABRAL DEBRIDEMENT;  Surgeon: William Bowman MD;  Location: [facility] OR [facility];  Service:                              PT Assessment/Plan       17 [phone] 17 [phone]       PT Assessment    Assessment Comments Shoulder strength slowly improving. Patient continues to have difficulty with bed mobility aby shoulders. Continue to have sleep disruptions  -WS Progressing well with exercises. Increased weight with S/L ER  -WS       User Key  (r) = Recorded By, (t) = Taken By, (c) = Cosigned By    Initials Name Provider Type    WS José Miguel Chairez PTA Physical Therapy Assistant                Modalities       17 0800          Ice    Ice Applied Yes  -WS      Location L shoulder, pt. seated, LUE rested on pillows in lose pack  -WS      Rx Minutes 10  mins  -WS      Ice S/P Rx Yes  -WS        User Key  (r) = Recorded By, (t) = Taken By, (c) = Cosigned By    Initials Name Provider Type    WS José Miguel Chairez PTA Physical Therapy Assistant                Exercises       01/16/17 0800          Subjective Comments    Subjective Comments Shoulder is just sore  -WS      Subjective Pain    Able to rate subjective pain? yes  -WS      Pre-Treatment Pain Level 1  -WS      Exercise 1    Exercise Name 1 shoulder shurgs  -WS      Cueing 1 Verbal  -WS      Equipment 1 Dumbell  -WS      Weights/Plates 1 2  -WS      Reps 1 20  -WS      Exercise 2    Exercise Name 2 scap retraction, no shoulder ext  -WS      Cueing 2 Tactile;Verbal;Demo;Auditory  -WS      Equipment 2 Theraband  -WS      Resistance 2 Green  -WS      Reps 2 20  -WS      Exercise 3    Exercise Name 3 elbow flexion/ext ROM   -WS      Cueing 3 Verbal  -WS      Equipment 3 Dumbell  -WS      Weights/Plates 3 2  -WS      Reps 3 20  -WS      Exercise 5    Weights/Plates 5 --   7  -WS      Exercise 6    Exercise Name 6 AAROM abduction with cane  -WS      Cueing 6 Verbal  -WS      Reps 6 20  -WS      Time (Minutes) 6 2  -WS      Time (Seconds) 6 20  -WS      Exercise 7    Exercise Name 7 wash the wall, both hands  -WS      Cueing 7 Verbal  -WS      Sets 7 1  -WS      Reps 7 15  -WS      Time (Seconds) 7 5  -WS      Exercise 8    Exercise Name 8 chest press, cane, supine  -WS      Cueing 8 Verbal  -WS      Equipment 8 Cuff Weight  -WS      Weights/Plates 8 5  -WS      Sets 8 1  -WS      Reps 8 15  -WS      Exercise 10    Exercise Name 10 FIR with towel  -WS      Cueing 10 Verbal  -WS      Sets 10 1  -WS      Reps 10 5  -WS      Time (Seconds) 10 10  -WS      Exercise 11    Exercise Name 11 prone shoulder ext  -WS      Cueing 11 Verbal  -WS      Equipment 11 Dumbell  -WS      Weights/Plates 11 2  -WS      Sets 11 2  -WS      Reps 11 10  -WS      Exercise 12    Exercise Name 12 supine horizontal abduction  -WS      Cueing 12  Verbal  -WS      Equipment 12 Theraband  -WS      Resistance 12 Red  -WS      Reps 12 15  -WS      Exercise 13    Exercise Name 13 SL ER, towel roll   -WS      Cueing 13 Tactile  -WS      Equipment 13 Dumbell  -WS      Weights/Plates 13 2  -WS      Sets 13 2  -WS      Reps 13 10  -WS      Exercise 14    Exercise Name 14 prone row to neutral  -WS      Cueing 14 Tactile  -WS      Equipment 14 Dumbell  -WS      Weights/Plates 14 2  -WS      Reps 14 15  -WS        User Key  (r) = Recorded By, (t) = Taken By, (c) = Cosigned By    Initials Name Provider Type    ABISAI Chairez PTA Physical Therapy Assistant                        Manual Rx (last 36 hours)      Manual Treatments       01/16/17 0843          Manual Rx 1    Manual Rx 1 Location gentle inferior mobs L shoulder/oscillations for relaxation, pt. supine  -WS      Manual Rx 2    Manual Rx 2 Location PROM L shoulder, pt. supine, into flexion, abduction, ER, IR   -WS      Manual Rx 3    Manual Rx 3 Location gentle rhythmic stabilization LUE, flexed to 90  -WS      Manual Rx 4    Manual Rx 4 Location rhythmic stabilization of ER/IR, POS, pt. supine, varying angles of abduction.   -WS        User Key  (r) = Recorded By, (t) = Taken By, (c) = Cosigned By    Initials Name Provider Type    ABISAI Chairez PTA Physical Therapy Assistant                PT OP Goals       01/16/17 0800 01/11/17 0900 01/04/17 0900    PT Short Term Goals    STG Date to Achieve 11/29/16  -WS 11/29/16  -WS 11/29/16  -WS    STG 1 pt. to be I with initial HEP to facilitate self management of their condition  -WS pt. to be I with initial HEP to facilitate self management of their condition  -WS pt. to be I with initial HEP to facilitate self management of their condition  -WS    STG 1 Progress Met  -WS Met  -WS Met  -WS    STG 2 pt. to be educated in/verbalize understanding of the importance of posture/ergonomics in association with their condition to facilitate self management of their  condition  -WS pt. to be educated in/verbalize understanding of the importance of posture/ergonomics in association with their condition to facilitate self management of their condition  -WS pt. to be educated in/verbalize understanding of the importance of posture/ergonomics in association with their condition to facilitate self management of their condition  -WS    STG 2 Progress Met  -WS Met  -WS Met  -WS    STG 3 pt. to demonstrate L shoulder flexion PROM >/= 0-120 to facilitate ease of performing self care activities  -WS pt. to demonstrate L shoulder flexion PROM >/= 0-120 to facilitate ease of performing self care activities  -WS pt. to demonstrate L shoulder flexion PROM >/= 0-120 to facilitate ease of performing self care activities  -WS    STG 3 Progress Met  -WS Met  -WS Met  -WS    STG 4 pt. to demonstrate L shoulder PROM ER >/= 0-45 to facilitate ease of performing self care activities  -WS pt. to demonstrate L shoulder PROM ER >/= 0-45 to facilitate ease of performing self care activities  -WS pt. to demonstrate L shoulder PROM ER >/= 0-45 to facilitate ease of performing self care activities  -WS    STG 4 Progress Met  -WS Met  -WS Met  -WS    STG 5 pt. to report sleeping through the night without interruption secondary to her L shoulder pain to facilitate optimal healing   -WS pt. to report sleeping through the night without interruption secondary to her L shoulder pain to facilitate optimal healing   -WS pt. to report sleeping through the night without interruption secondary to her L shoulder pain to facilitate optimal healing   -WS    STG 5 Progress Ongoing  -WS Ongoing  -WS Ongoing  -WS    Long Term Goals    LTG Date to Achieve 01/11/17  -WS 01/11/17  -WS 01/11/17  -WS    LTG 1 pt. to be I with advanced HEP to facilitate self management of their condition  -WS pt. to be I with advanced HEP to facilitate self management of their condition  -WS pt. to be I with advanced HEP to facilitate self  management of their condition  -WS    LTG 1 Progress Ongoing  -WS Ongoing  -WS Ongoing  -WS    LTG 2 pt. to report a DASH </= 20% to demonstrate decreased level of perceived disability  -WS pt. to report a DASH </= 20% to demonstrate decreased level of perceived disability  -WS pt. to report a DASH </= 20% to demonstrate decreased level of perceived disability  -WS    LTG 2 Progress Ongoing  -WS Ongoing  -WS Ongoing  -WS    LTG 3 pt. to demonstrate L shoulder AROM FIR >/= mid line belt line to facilitate ease of performing self care/dressing activities  -WS pt. to demonstrate L shoulder AROM FIR >/= mid line belt line to facilitate ease of performing self care/dressing activities  -WS pt. to demonstrate L shoulder AROM FIR >/= mid line belt line to facilitate ease of performing self care/dressing activities  -WS    LTG 3 Progress Ongoing  -WS Ongoing  -WS Ongoing  -WS    LTG 4 pt. to demonstrate L shoulder flexion AROM >/= 0-120 to facilitate ease of performing functional/household activities  -WS pt. to demonstrate L shoulder flexion AROM >/= 0-120 to facilitate ease of performing functional/household activities  -WS pt. to demonstrate L shoulder flexion AROM >/= 0-120 to facilitate ease of performing functional/household activities  -WS    LTG 4 Progress Ongoing  -WS Ongoing  -WS Ongoing  -WS    LTG 5 pt. to demonstrate placing/retrieving small object from an above the shoulder level shelf with her LUE to facilitate ease of performing household/work related activities  -WS pt. to demonstrate placing/retrieving small object from an above the shoulder level shelf with her LUE to facilitate ease of performing household/work related activities  -WS pt. to demonstrate placing/retrieving small object from an above the shoulder level shelf with her LUE to facilitate ease of performing household/work related activities  -WS    LTG 5 Progress Ongoing  -WS Ongoing  -WS Ongoing  -WS    LTG 6 pt. to report initiating personal  fitness program at [facility] to faciliate optimal carry over and improved overall health  -WS pt. to report initiating personal fitness program at [facility] to faciliate optimal carry over and improved overall health  -WS pt. to report initiating personal fitness program at [facility] to faciliate optimal carry over and improved overall health  -WS    LTG 6 Progress Met  -WS Met  -WS Met  -WS      User Key  (r) = Recorded By, (t) = Taken By, (c) = Cosigned By    Initials Name Provider Type    ABISAI Chairez PTA Physical Therapy Assistant                Therapy Education       01/11/17 0936    Therapy Education    Given Posture/body mechanics;HEP  -WS    Program Reinforced  -WS    How Provided Verbal  -WS    Provided to Patient  -WS      User Key  (r) = Recorded By, (t) = Taken By, (c) = Cosigned By    Initials Name Provider Type    ABISAI Chairez PTA Physical Therapy Assistant                Time Calculation:   Start Time: 0800  Stop Time: 0855  Time Calculation (min): 55 min    Therapy Charges for Today     Code Description Service Date Service Provider Modifiers Qty    75041434118 HC PT THER PROC EA 15 MIN 1/16/2017 José Miguel Chairez PTA GP 2    12317478341 HC PT MANUAL THERAPY EA 15 MIN 1/16/2017 José Miguel Chairez PTA GP 1    88126046433 HC PT HOT OR COLD PACK TREAT [facility] 1/16/2017 José Miguel Chairez PTA GP 1                    José Miguel Chairez PTA  1/16/2017

## 2017-01-18 ENCOUNTER — APPOINTMENT (OUTPATIENT)
Dept: PHYSICAL THERAPY | Facility: HOSPITAL | Age: 52
End: 2017-01-18

## 2017-01-23 ENCOUNTER — HOSPITAL ENCOUNTER (OUTPATIENT)
Dept: PHYSICAL THERAPY | Facility: HOSPITAL | Age: 52
Setting detail: THERAPIES SERIES
Discharge: HOME | End: 2017-01-23

## 2017-01-23 DIAGNOSIS — Z98.890 S/P ROTATOR CUFF REPAIR: ICD-10-CM

## 2017-01-23 DIAGNOSIS — Z47.89 ORTHOPEDIC AFTERCARE: Primary | ICD-10-CM

## 2017-01-23 DIAGNOSIS — Z74.09 IMPAIRED MOBILITY: ICD-10-CM

## 2017-01-23 PROCEDURE — 97140 MANUAL THERAPY 1/> REGIONS: CPT | Performed by: PHYSICAL THERAPIST

## 2017-01-23 PROCEDURE — 97110 THERAPEUTIC EXERCISES: CPT | Performed by: PHYSICAL THERAPIST

## 2017-01-23 NOTE — PROGRESS NOTES
Outpatient Physical Therapy Ortho Treatment Note  [facility]     Patient Name: Korina Raygoza  : 1965  MRN: [id number]  Today's Date: 2017      Visit Date: 2017    Visit Dx:    ICD-10-CM ICD-9-CM   1. Orthopedic aftercare Z47.89 V54.9   2. S/P rotator cuff repair Z98.890 V45.89   3. Impaired mobility Z74.09 799.89       Patient Active Problem List   Diagnosis   • Tear of medial meniscus of knee   • Osteoarthrosis, localized, primary, knee   • Knee pain, bilateral   • Tendonitis, Achilles, right   • Rotator cuff tendinitis   • Elbow laceration   • Complete tear of left rotator cuff        Past Medical History   Diagnosis Date   • Arthritis      KNEES   • Asthma    • History of herpes zoster    • Meniscus tear      LEFT - CURRENT   • Rosacea    • Rotator cuff tear         Past Surgical History   Procedure Laterality Date   • Mole removal  2013   • Knee surgery     • Shoulder arthroscopy w/ rotator cuff repair Left 10/18/2016     Procedure: SHOULDER ARTHROSCOPY WITH ROTATOR CUFF REPAIR AND LABRAL DEBRIDEMENT;  Surgeon: William Bowman MD;  [place] [facility] [place] [facility] [place]  Service:              PT Ortho       17 1000    Left Shoulder    Flexion AROM Deficit 140, seated  -GJ    Flexion PROM Deficit 170, supine,   -GJ    ABduction AROM Deficit 140, seated  -GJ    ABduction PROM Deficit 165 supine  -GJ    External Rotation PROM Deficit 80, supine, POS, 90 abd  -GJ    Internal Rotation AROM Deficit FIR midline belt line  -GJ    Internal Rotation PROM Deficit 75, supine, POS 90 abd  -GJ      User Key  (r) = Recorded By, (t) = Taken By, (c) = Cosigned By    Initials Name Provider Type    MERCY Henao PT Physical Therapist                            PT Assessment/Plan       17 1215          PT Assessment    Functional Limitations Limitation in home management;Performance in self-care ADL;Performance in leisure activities;Limitations in functional capacity and performance;Limitations in  community activities;Performance in work activities  -      Impairments Range of motion;Muscle strength;Pain;Poor body mechanics;Posture;Joint mobility;Impaired muscle length;Impaired muscle power;Impaired muscle endurance;Impaired flexibility  -GJ      Assessment Comments Ms. Raygoza is a 50y/o R handed female.  She is 14 weeks s/p L RC repair (sx 10/18/16).  She has attended 12 sessions of physical therpay since her surgery.  She demonstrates improving A/PROM (see specific joints).  She reports well controlled pain, demonstrating decreased compensation with attempts at AROM and reports a DASH score of 49%, scored 0-100%, 0% represents no perceived disability.  Ms. Raygoza demosntrates improving condition (complicated by multiple joint limitations in ROM).  She will benefit from skilled physical therapy intervention to address the above impairments.    -GJ      Please refer to paper survey for additional self-reported information Yes  -GJ      Rehab Potential Excellent  -GJ      Patient/caregiver participated in establishment of treatment plan and goals Yes  -GJ      Patient would benefit from skilled therapy intervention Yes  -GJ      PT Plan    PT Frequency 1x/week;2x/week  -GJ      Predicted Duration of Therapy Intervention (days/wks) 4 weeks   -GJ      Planned CPT's? PT EVAL: 42296;PT RE-EVAL: 61888;PT THER PROC EA 15 MIN: 85608;PT MANUAL THERAPY EA 15 MIN: 72114;PT HOT OR COLD PACK TREAT [facility]ARE;PT ELECTRICAL STIM UNATTEND: ;PT ULTRASOUND EA 15 MIN: 68210  -GJ      PT Plan Comments continue to progress L shoulder ROM/strength, continue with slow reversal activities.  -GJ        User Key  (r) = Recorded By, (t) = Taken By, (c) = Cosigned By    Initials Name Provider Type    MERCY Henao, PT Physical Therapist                Modalities       01/23/17 1000          Ice    Ice Applied Yes  -GJ      Location L shoulder, pt. seated, LUE rested on pillows in lose pack  -GJ      Rx Minutes 10 mins  -GJ       Ice S/P Rx Yes  -GJ        User Key  (r) = Recorded By, (t) = Taken By, (c) = Cosigned By    Initials Name Provider Type    MERCY Henao, PT Physical Therapist                Exercises       01/23/17 1000          Subjective Comments    Subjective Comments I've been sick, I think I have been falling behind with my exercises. Still having trouble sleeping  -GJ      Subjective Pain    Pre-Treatment Pain Level 1  -GJ      Exercise 1    Exercise Name 1 shoulder shurgs  -GJ      Equipment 1 Dumbell  -GJ      Weights/Plates 1 3  -GJ      Reps 1 20  -GJ      Exercise 2    Exercise Name 2 scap retraction, no shoulder ext  -GJ      Cueing 2 Verbal;Tactile  -GJ      Equipment 2 Theraband  -GJ      Resistance 2 Green  -GJ      Reps 2 20  -GJ      Exercise 3    Exercise Name 3 elbow flexion/ext ROM   -GJ      Cueing 3 Verbal  -GJ      Equipment 3 Dumbell  -GJ      Weights/Plates 3 3  -GJ      Reps 3 20  -GJ      Exercise 7    Exercise Name 7 wash the wall, LUE  -GJ      Reps 7 20  -GJ      Exercise 8    Exercise Name 8 chest press, cane, supine  -GJ      Cueing 8 Verbal  -GJ      Equipment 8 Cuff Weight  -GJ      Weights/Plates 8 5  -GJ      Sets 8 2  -GJ      Reps 8 10  -GJ      Exercise 10    Exercise Name 10 FIR with towel  -GJ      Cueing 10 Tactile  -GJ      Reps 10 5  -GJ      Time (Seconds) 10 10  -GJ      Exercise 11    Exercise Name 11 prone shoulder ext  -GJ      Cueing 11 Verbal  -GJ      Equipment 11 Dumbell  -GJ      Weights/Plates 11 2  -GJ      Sets 11 2  -GJ      Reps 11 10  -GJ      Exercise 12    Exercise Name 12 supine clock, horizontal/diagonals   -GJ      Cueing 12 Verbal  -GJ      Equipment 12 Theraband  -GJ      Resistance 12 Red  -GJ      Sets 12 2  -GJ      Reps 12 10  -GJ      Exercise 13    Exercise Name 13 SL ER, towel roll   -GJ      Cueing 13 Tactile  -GJ      Equipment 13 Dumbell  -GJ      Weights/Plates 13 2  -GJ      Sets 13 2  -GJ      Reps 13 10  -GJ      Exercise 14    Exercise Name 14  prone row to neutral  -GJ      Cueing 14 Tactile  -GJ      Equipment 14 Dumbell  -GJ      Weights/Plates 14 2  -GJ      Sets 14 2  -GJ      Reps 14 10  -GJ      Exercise 15    Exercise Name 15 walk the wall  -GJ      Cueing 15 Demo  -GJ      Sets 15 2  -GJ      Reps 15 5  -GJ        User Key  (r) = Recorded By, (t) = Taken By, (c) = Cosigned By    Initials Name Provider Type    MERCY Henao, PT Physical Therapist                        Manual Rx (last 36 hours)      Manual Treatments       01/23/17 0900          Manual Rx 1    Manual Rx 1 Location gentle inferior mobs L shoulder/oscillations for relaxation, pt. supine  -GJ      Manual Rx 2    Manual Rx 2 Location PROM L shoulder, pt. supine, into flexion, abduction, ER, IR   -GJ      Manual Rx 3    Manual Rx 3 Location slow reversal L ER/IR, 90 abd, POS, supine   -GJ      Manual Rx 4    Manual Rx 4 Location slow reversal L shoulder D2, supine   -GJ        User Key  (r) = Recorded By, (t) = Taken By, (c) = Cosigned By    Initials Name Provider Type    MERCY Henao, PT Physical Therapist                PT OP Goals       01/23/17 1000 01/16/17 0800 01/11/17 0900    PT Short Term Goals    STG Date to Achieve 11/29/16  -GJ 11/29/16  -WS 11/29/16  -WS    STG 1 pt. to be I with initial HEP to facilitate self management of their condition  -GJ pt. to be I with initial HEP to facilitate self management of their condition  -WS pt. to be I with initial HEP to facilitate self management of their condition  -WS    STG 1 Progress Met  -GJ Met  -WS Met  -WS    STG 2 pt. to be educated in/verbalize understanding of the importance of posture/ergonomics in association with their condition to facilitate self management of their condition  -GJ pt. to be educated in/verbalize understanding of the importance of posture/ergonomics in association with their condition to facilitate self management of their condition  -WS pt. to be educated in/verbalize understanding of the  importance of posture/ergonomics in association with their condition to facilitate self management of their condition  -WS    STG 2 Progress Met  -GJ Met  -WS Met  -WS    STG 3 pt. to demonstrate L shoulder flexion PROM >/= 0-120 to facilitate ease of performing self care activities  -GJ pt. to demonstrate L shoulder flexion PROM >/= 0-120 to facilitate ease of performing self care activities  -WS pt. to demonstrate L shoulder flexion PROM >/= 0-120 to facilitate ease of performing self care activities  -WS    STG 3 Progress Met  -GJ Met  -WS Met  -WS    STG 4 pt. to demonstrate L shoulder PROM ER >/= 0-45 to facilitate ease of performing self care activities  -GJ pt. to demonstrate L shoulder PROM ER >/= 0-45 to facilitate ease of performing self care activities  -WS pt. to demonstrate L shoulder PROM ER >/= 0-45 to facilitate ease of performing self care activities  -WS    STG 4 Progress Met  -GJ Met  -WS Met  -WS    STG 5 pt. to report sleeping through the night without interruption secondary to her L shoulder pain to facilitate optimal healing   -GJ pt. to report sleeping through the night without interruption secondary to her L shoulder pain to facilitate optimal healing   -WS pt. to report sleeping through the night without interruption secondary to her L shoulder pain to facilitate optimal healing   -WS    STG 5 Progress Ongoing  -GJ Ongoing  -WS Ongoing  -WS    STG 5 Progress Comments having trouble sleeping  -GJ      Long Term Goals    LTG Date to Achieve 02/13/17  -GJ 01/11/17  -WS 01/11/17  -WS    LTG 1 pt. to be I with advanced HEP to facilitate self management of their condition  -GJ pt. to be I with advanced HEP to facilitate self management of their condition  -WS pt. to be I with advanced HEP to facilitate self management of their condition  -WS    LTG 1 Progress Ongoing  -GJ Ongoing  -WS Ongoing  -WS    LTG 2 pt. to report a DASH </= 20% to demonstrate decreased level of perceived disability  -GJ  pt. to report a DASH </= 20% to demonstrate decreased level of perceived disability  -WS pt. to report a DASH </= 20% to demonstrate decreased level of perceived disability  -WS    LTG 2 Progress Ongoing  -GJ Ongoing  -WS Ongoing  -WS    LTG 2 Progress Comments 49%  -GJ      LTG 3 pt. to demonstrate L shoulder AROM FIR >/= mid line belt line to facilitate ease of performing self care/dressing activities  -GJ pt. to demonstrate L shoulder AROM FIR >/= mid line belt line to facilitate ease of performing self care/dressing activities  -WS pt. to demonstrate L shoulder AROM FIR >/= mid line belt line to facilitate ease of performing self care/dressing activities  -WS    LTG 3 Progress Met  -GJ Ongoing  -WS Ongoing  -WS    LTG 4 pt. to demonstrate L shoulder flexion AROM >/= 0-120 to facilitate ease of performing functional/household activities  -GJ pt. to demonstrate L shoulder flexion AROM >/= 0-120 to facilitate ease of performing functional/household activities  -WS pt. to demonstrate L shoulder flexion AROM >/= 0-120 to facilitate ease of performing functional/household activities  -WS    LTG 4 Progress Partially Met  -GJ Ongoing  -WS Ongoing  -WS    LTG 4 Progress Comments some compensation  -GJ      LTG 5 pt. to demonstrate placing/retrieving small object from an above the shoulder level shelf with her LUE to facilitate ease of performing household/work related activities  -GJ pt. to demonstrate placing/retrieving small object from an above the shoulder level shelf with her LUE to facilitate ease of performing household/work related activities  -WS pt. to demonstrate placing/retrieving small object from an above the shoulder level shelf with her LUE to facilitate ease of performing household/work related activities  -WS    LTG 5 Progress Ongoing  -GJ Ongoing  -WS Ongoing  -WS    LTG 6 pt. to report initiating personal fitness program at [facility] to faciliate optimal carry over and improved overall health  -GJ pt.  to report initiating personal fitness program at [facility] to faciliate optimal carry over and improved overall health  -WS pt. to report initiating personal fitness program at [facility] to faciliate optimal carry over and improved overall health  -WS    LTG 6 Progress Met  -GJ Met  -WS Met  -WS      User Key  (r) = Recorded By, (t) = Taken By, (c) = Cosigned By    Initials Name Provider Type    MERCY Henao PT Physical Therapist    WS José Miguel Chairez PTA Physical Therapy Assistant                Therapy Education       01/23/17 1000    Therapy Education    Given HEP;Symptoms/condition management;Pain management;Posture/body mechanics;Mobility training  -GJ    Program New  -GJ    How Provided Verbal  -GJ    Provided to Patient  -GJ    Level of Understanding Verbalized  -GJ      User Key  (r) = Recorded By, (t) = Taken By, (c) = Cosigned By    Initials Name Provider Type    MERCY Henao PT Physical Therapist                Outcome Measures       01/23/17 1000          Functional Assessment    Outcome Measure Options Disabilities of the Arm, Shoulder, and Hand (DASH)   49%  -GJ        User Key  (r) = Recorded By, (t) = Taken By, (c) = Cosigned By    Initials Name Provider Type    MERCY Henao PT Physical Therapist            Time Calculation:   Start Time: 0950  Stop Time: 1108  Time Calculation (min): 78 min    Therapy Charges for Today     Code Description Service Date Service Provider Modifiers Qty    08935766931 HC PT HOT OR COLD PACK TREAT [facility] 1/23/2017 Gerard Henao PT GP 1    16079726446 HC PT THER PROC EA 15 MIN 1/23/2017 Gerard Henao PT GP 3    25844984986 HC PT MANUAL THERAPY EA 15 MIN 1/23/2017 Gerard Henao, PT GP 1          PT G-Codes  Outcome Measure Options: Disabilities of the Arm, Shoulder, and Hand (DASH) (49%)         Gerard Henao PT  1/23/2017

## 2017-01-25 ENCOUNTER — APPOINTMENT (OUTPATIENT)
Dept: PHYSICAL THERAPY | Facility: HOSPITAL | Age: 52
End: 2017-01-25

## 2017-01-26 ENCOUNTER — APPOINTMENT (OUTPATIENT)
Dept: PHYSICAL THERAPY | Facility: HOSPITAL | Age: 52
End: 2017-01-26

## 2017-01-30 ENCOUNTER — HOSPITAL ENCOUNTER (OUTPATIENT)
Dept: PHYSICAL THERAPY | Facility: HOSPITAL | Age: 52
Setting detail: THERAPIES SERIES
Discharge: HOME | End: 2017-01-30

## 2017-01-30 DIAGNOSIS — M17.0 PRIMARY OSTEOARTHRITIS OF BOTH KNEES: ICD-10-CM

## 2017-01-30 DIAGNOSIS — Z74.09 IMPAIRED MOBILITY: ICD-10-CM

## 2017-01-30 DIAGNOSIS — Z98.890 S/P ROTATOR CUFF REPAIR: ICD-10-CM

## 2017-01-30 DIAGNOSIS — Z47.89 ORTHOPEDIC AFTERCARE: Primary | ICD-10-CM

## 2017-01-30 DIAGNOSIS — M25.661 LIMITATION OF JOINT MOTION OF KNEE, RIGHT: ICD-10-CM

## 2017-01-30 DIAGNOSIS — M25.662 KNEE STIFFNESS, LEFT: ICD-10-CM

## 2017-01-30 PROCEDURE — 97110 THERAPEUTIC EXERCISES: CPT

## 2017-01-30 PROCEDURE — 97140 MANUAL THERAPY 1/> REGIONS: CPT

## 2017-01-30 NOTE — PROGRESS NOTES
Outpatient Physical Therapy Ortho Treatment Note  [facility]     Patient Name: Korina Raygoza  : 1965  MRN: [id number]  Today's Date: 2017      Visit Date: 2017    Visit Dx:    ICD-10-CM ICD-9-CM   1. Orthopedic aftercare Z47.89 V54.9   2. S/P rotator cuff repair Z98.890 V45.89   3. Impaired mobility Z74.09 799.89   4. Knee stiffness, left M25.662 719.56   5. Limitation of joint motion of knee, right M25.661 719.56   6. Primary osteoarthritis of both knees M17.0 715.16       Patient Active Problem List   Diagnosis   • Tear of medial meniscus of knee   • Osteoarthrosis, localized, primary, knee   • Knee pain, bilateral   • Tendonitis, Achilles, right   • Rotator cuff tendinitis   • Elbow laceration   • Complete tear of left rotator cuff        Past Medical History   Diagnosis Date   • Arthritis      KNEES   • Asthma    • History of herpes zoster    • Meniscus tear      LEFT - CURRENT   • Rosacea    • Rotator cuff tear         Past Surgical History   Procedure Laterality Date   • Mole removal  2013   • Knee surgery     • Shoulder arthroscopy w/ rotator cuff repair Left 10/18/2016     Procedure: SHOULDER ARTHROSCOPY WITH ROTATOR CUFF REPAIR AND LABRAL DEBRIDEMENT;  Surgeon: William Bowman MD;  Location: [facility] OR [facility] [place];  Service:                              PT Assessment/Plan       17 0850 17 1215       PT Assessment    Functional Limitations  Limitation in home management;Performance in self-care ADL;Performance in leisure activities;Limitations in functional capacity and performance;Limitations in community activities;Performance in work activities  -GJ     Impairments  Range of motion;Muscle strength;Pain;Poor body mechanics;Posture;Joint mobility;Impaired muscle length;Impaired muscle power;Impaired muscle endurance;Impaired flexibility  -GJ     Assessment Comments Patient was able to rake leaves over the weekend without increased pain/symptoms. Strength and stability  continue to improve.   -WS Ms. Raygoza is a 52y/o R handed female.  She is 14 weeks s/p L RC repair (sx 10/18/16).  She has attended 12 sessions of physical therpay since her surgery.  She demonstrates improving A/PROM (see specific joints).  She reports well controlled pain, demonstrating decreased compensation with attempts at AROM and reports a DASH score of 49%, scored 0-100%, 0% represents no perceived disability.  Ms. Raygoza demosntrates improving condition (complicated by multiple joint limitations in ROM).  She will benefit from skilled physical therapy intervention to address the above impairments.    -GJ     Please refer to paper survey for additional self-reported information  Yes  -GJ     Rehab Potential  Excellent  -GJ     Patient/caregiver participated in establishment of treatment plan and goals  Yes  -GJ     Patient would benefit from skilled therapy intervention  Yes  -GJ     PT Plan    PT Frequency  1x/week;2x/week  -GJ     Predicted Duration of Therapy Intervention (days/wks)  4 weeks   -GJ     Planned CPT's?  PT EVAL: 69376;PT RE-EVAL: 96058;PT THER PROC EA 15 MIN: 79174;PT MANUAL THERAPY EA 15 MIN: 93264;PT HOT OR COLD PACK TREAT [facility]ARE;PT ELECTRICAL STIM UNATTEND: ;PT ULTRASOUND EA 15 MIN: 90780  -GJ     PT Plan Comments  continue to progress L shoulder ROM/strength, continue with slow reversal activities.  -GJ       User Key  (r) = Recorded By, (t) = Taken By, (c) = Cosigned By    Initials Name Provider Type    MERCY Henao, PT Physical Therapist    ABISAI Chairez PTA Physical Therapy Assistant                Modalities       01/30/17 0800          Ice    Ice Applied Yes  -WS      Location L shoulder, pt. seated, LUE rested on pillows in lose pack  -WS      Rx Minutes 10 mins  -WS      Ice S/P Rx Yes  -WS        User Key  (r) = Recorded By, (t) = Taken By, (c) = Cosigned By    Initials Name Provider Type    ABISAI Chairez PTA Physical Therapy Assistant                 Exercises       01/30/17 0800          Subjective Comments    Subjective Comments Sleeping better  -WS      Subjective Pain    Able to rate subjective pain? yes  -WS      Pre-Treatment Pain Level 1  -WS      Exercise 1    Exercise Name 1 shoulder shurgs  -WS      Cueing 1 Verbal  -WS      Equipment 1 Dumbell  -WS      Weights/Plates 1 3  -WS      Sets 1 2  -WS      Reps 1 20  -WS      Exercise 2    Exercise Name 2 scap retraction, no shoulder ext  -WS      Cueing 2 Verbal;Tactile  -WS      Equipment 2 Theraband  -WS      Resistance 2 Green  -WS      Reps 2 20  -WS      Exercise 3    Exercise Name 3 elbow flexion/ext ROM   -WS      Cueing 3 Verbal  -WS      Equipment 3 Dumbell  -WS      Weights/Plates 3 3  -WS      Reps 3 20  -WS      Exercise 6    Exercise Name 6 AAROM abduction with cane  -WS      Cueing 6 Verbal  -WS      Reps 6 20  -WS      Time (Minutes) 6 2  -WS      Exercise 7    Exercise Name 7 wash the wall, LUE  -WS      Cueing 7 Verbal  -WS      Sets 7 1  -WS      Reps 7 20  -WS      Exercise 8    Exercise Name 8 chest press, cane, supine  -WS      Cueing 8 Verbal  -WS      Equipment 8 Cuff Weight  -WS      Weights/Plates 8 5  -WS      Sets 8 2  -WS      Reps 8 10  -WS      Exercise 10    Exercise Name 10 FIR with towel  -WS      Cueing 10 Tactile  -WS      Sets 10 1  -WS      Reps 10 5  -WS      Time (Seconds) 10 10  -WS      Exercise 11    Exercise Name 11 prone shoulder ext  -WS      Cueing 11 Verbal  -WS      Equipment 11 Dumbell  -WS      Weights/Plates 11 2  -WS      Sets 11 2  -WS      Reps 11 10  -WS      Exercise 12    Exercise Name 12 supine clock, horizontal/diagonals   -WS      Cueing 12 Verbal  -WS      Equipment 12 Theraband  -WS      Resistance 12 Red  -WS      Sets 12 2  -WS      Reps 12 10  -WS      Exercise 13    Exercise Name 13 SL ER, towel roll   -WS      Cueing 13 Tactile  -WS      Equipment 13 Dumbell  -WS      Weights/Plates 13 2  -WS      Sets 13 2  -WS      Reps 13 10  -WS       Exercise 14    Exercise Name 14 prone row to neutral  -WS      Cueing 14 Tactile  -WS      Equipment 14 Dumbell  -WS      Weights/Plates 14 2  -WS      Sets 14 2  -WS      Reps 14 10  -WS      Exercise 15    Exercise Name 15 walk the wall  -WS      Cueing 15 Demo  -WS      Resistance 15 Yellow  -WS      Sets 15 2  -WS      Reps 15 5  -WS        User Key  (r) = Recorded By, (t) = Taken By, (c) = Cosigned By    Initials Name Provider Type     José Miguel Chairez PTA Physical Therapy Assistant                        Manual Rx (last 36 hours)      Manual Treatments       01/30/17 0826          Manual Rx 1    Manual Rx 1 Location gentle inferior mobs L shoulder/oscillations for relaxation, pt. supine  -WS      Manual Rx 2    Manual Rx 2 Location PROM L shoulder, pt. supine, into flexion, abduction, ER, IR   -WS      Manual Rx 3    Manual Rx 3 Location slow reversal L ER/IR, 90 abd, POS, supine   -WS      Manual Rx 4    Manual Rx 4 Location slow reversal L shoulder D2, supine   -WS        User Key  (r) = Recorded By, (t) = Taken By, (c) = Cosigned By    Initials Name Provider Type     José Miguel Chairez PTA Physical Therapy Assistant                PT OP Goals       01/23/17 1000          PT Short Term Goals    STG Date to Achieve 11/29/16  -      STG 1 pt. to be I with initial HEP to facilitate self management of their condition  -      STG 1 Progress Met  -GJ      STG 2 pt. to be educated in/verbalize understanding of the importance of posture/ergonomics in association with their condition to facilitate self management of their condition  -GJ      STG 2 Progress Met  -GJ      STG 3 pt. to demonstrate L shoulder flexion PROM >/= 0-120 to facilitate ease of performing self care activities  -GJ      STG 3 Progress Met  -GJ      STG 4 pt. to demonstrate L shoulder PROM ER >/= 0-45 to facilitate ease of performing self care activities  -GJ      STG 4 Progress Met  -GJ      STG 5 pt. to report sleeping through the night  without interruption secondary to her L shoulder pain to facilitate optimal healing   -GJ      STG 5 Progress Ongoing  -GJ      STG 5 Progress Comments having trouble sleeping  -GJ      Long Term Goals    LTG Date to Achieve 02/13/17  -GJ      LTG 1 pt. to be I with advanced HEP to facilitate self management of their condition  -GJ      LTG 1 Progress Ongoing  -GJ      LTG 2 pt. to report a DASH </= 20% to demonstrate decreased level of perceived disability  -GJ      LTG 2 Progress Ongoing  -GJ      LTG 2 Progress Comments 49%  -GJ      LTG 3 pt. to demonstrate L shoulder AROM FIR >/= mid line belt line to facilitate ease of performing self care/dressing activities  -GJ      LTG 3 Progress Met  -GJ      LTG 4 pt. to demonstrate L shoulder flexion AROM >/= 0-120 to facilitate ease of performing functional/household activities  -GJ      LTG 4 Progress Partially Met  -GJ      LTG 4 Progress Comments some compensation  -GJ      LTG 5 pt. to demonstrate placing/retrieving small object from an above the shoulder level shelf with her LUE to facilitate ease of performing household/work related activities  -GJ      LTG 5 Progress Ongoing  -GJ      LTG 6 pt. to report initiating personal fitness program at [facility] to faciliate optimal carry over and improved overall health  -GJ      LTG 6 Progress Met  -GJ        User Key  (r) = Recorded By, (t) = Taken By, (c) = Cosigned By    Initials Name Provider Type    MERCY Henao PT Physical Therapist                Therapy Education       01/23/17 1000    Therapy Education    Given HEP;Symptoms/condition management;Pain management;Posture/body mechanics;Mobility training  -GJ    Program New  -GJ    How Provided Verbal  -GJ    Provided to Patient  -GJ    Level of Understanding Verbalized  -GJ      User Key  (r) = Recorded By, (t) = Taken By, (c) = Cosigned By    Initials Name Provider Type    MERCY Henao PT Physical Therapist                Time Calculation:   Start  Time: 0800  Stop Time: 0900  Time Calculation (min): 60 min    Therapy Charges for Today     Code Description Service Date Service Provider Modifiers Qty    94901563355 HC PT THER PROC EA 15 MIN 1/30/2017 José Miguel Chairez PTA GP 2    43665638200 HC PT MANUAL THERAPY EA 15 MIN 1/30/2017 José Miguel Chairez PTA GP 1    22242526239 HC PT HOT OR COLD PACK TREAT [facility] 1/30/2017 José Miguel Chairez PTA GP 1                    José Miguel Chairez PTA  1/30/2017

## 2017-02-01 ENCOUNTER — HOSPITAL ENCOUNTER (OUTPATIENT)
Dept: PHYSICAL THERAPY | Facility: HOSPITAL | Age: 52
Setting detail: THERAPIES SERIES
Discharge: HOME | End: 2017-02-01

## 2017-02-01 DIAGNOSIS — M25.662 KNEE STIFFNESS, LEFT: ICD-10-CM

## 2017-02-01 DIAGNOSIS — Z47.89 ORTHOPEDIC AFTERCARE: Primary | ICD-10-CM

## 2017-02-01 DIAGNOSIS — Z74.09 IMPAIRED MOBILITY: ICD-10-CM

## 2017-02-01 DIAGNOSIS — M25.661 LIMITATION OF JOINT MOTION OF KNEE, RIGHT: ICD-10-CM

## 2017-02-01 DIAGNOSIS — M17.0 PRIMARY OSTEOARTHRITIS OF BOTH KNEES: ICD-10-CM

## 2017-02-01 DIAGNOSIS — Z98.890 S/P ROTATOR CUFF REPAIR: ICD-10-CM

## 2017-02-01 PROCEDURE — 97110 THERAPEUTIC EXERCISES: CPT

## 2017-02-01 PROCEDURE — 97140 MANUAL THERAPY 1/> REGIONS: CPT

## 2017-02-06 ENCOUNTER — HOSPITAL ENCOUNTER (OUTPATIENT)
Dept: PHYSICAL THERAPY | Facility: HOSPITAL | Age: 52
Setting detail: THERAPIES SERIES
Discharge: HOME | End: 2017-02-06

## 2017-02-06 DIAGNOSIS — M25.661 LIMITATION OF JOINT MOTION OF KNEE, RIGHT: ICD-10-CM

## 2017-02-06 DIAGNOSIS — M25.662 KNEE STIFFNESS, LEFT: ICD-10-CM

## 2017-02-06 DIAGNOSIS — Z47.89 ORTHOPEDIC AFTERCARE: Primary | ICD-10-CM

## 2017-02-06 DIAGNOSIS — Z74.09 IMPAIRED MOBILITY: ICD-10-CM

## 2017-02-06 DIAGNOSIS — Z98.890 S/P ROTATOR CUFF REPAIR: ICD-10-CM

## 2017-02-06 DIAGNOSIS — M17.0 PRIMARY OSTEOARTHRITIS OF BOTH KNEES: ICD-10-CM

## 2017-02-06 PROCEDURE — 97140 MANUAL THERAPY 1/> REGIONS: CPT

## 2017-02-06 PROCEDURE — 97110 THERAPEUTIC EXERCISES: CPT

## 2017-02-06 NOTE — PROGRESS NOTES
Outpatient Physical Therapy Ortho Treatment Note  [facility]     Patient Name: Korina Raygoza  : 1965  MRN: [id number]  Today's Date: 2017      Visit Date: 2017    Visit Dx:    ICD-10-CM ICD-9-CM   1. Orthopedic aftercare Z47.89 V54.9   2. S/P rotator cuff repair Z98.890 V45.89   3. Impaired mobility Z74.09 799.89   4. Knee stiffness, left M25.662 719.56   5. Limitation of joint motion of knee, right M25.661 719.56   6. Primary osteoarthritis of both knees M17.0 715.16       Patient Active Problem List   Diagnosis   • Tear of medial meniscus of knee   • Osteoarthrosis, localized, primary, knee   • Knee pain, bilateral   • Tendonitis, Achilles, right   • Rotator cuff tendinitis   • Elbow laceration   • Complete tear of left rotator cuff        Past Medical History   Diagnosis Date   • Arthritis      KNEES   • Asthma    • History of herpes zoster    • Meniscus tear      LEFT - CURRENT   • Rosacea    • Rotator cuff tear         Past Surgical History   Procedure Laterality Date   • Mole removal  2013   • Knee surgery     • Shoulder arthroscopy w/ rotator cuff repair Left 10/18/2016     Procedure: SHOULDER ARTHROSCOPY WITH ROTATOR CUFF REPAIR AND LABRAL DEBRIDEMENT;  Surgeon: William Bowman MD;  Location: [facility] OR [facility];  Service:                              PT Assessment/Plan       17 0834 17 0841 17 0850    PT Assessment    Assessment Comments No pain today. Working on increasing functional strength and endurance  - Pain is intermittent. Improving sleeping with decreased interuptions. Strength continue to improve.   -WS Patient was able to rake leaves over the weekend without increased pain/symptoms. Strength and stability continue to improve.   -WS      User Key  (r) = Recorded By, (t) = Taken By, (c) = Cosigned By    Initials Name Provider Type    ABISAI Chairez PTA Physical Therapy Assistant                Modalities       17 0800          Ice    Ice  Applied Yes  -WS      Location L shoulder, pt. seated, LUE rested on pillows in lose pack  -WS      Rx Minutes 10 mins  -WS      Ice S/P Rx Yes  -WS        User Key  (r) = Recorded By, (t) = Taken By, (c) = Cosigned By    Initials Name Provider Type    WS José Miguel Chairez PTA Physical Therapy Assistant                Exercises       02/06/17 0800          Subjective Comments    Subjective Comments Had 6 hours of bad pain yesterday from pushing a box. Feels better today.   -WS      Subjective Pain    Able to rate subjective pain? yes  -WS      Pre-Treatment Pain Level 0  -WS      Exercise 1    Exercise Name 1 shoulder shurgs  -WS      Cueing 1 Verbal  -WS      Equipment 1 Dumbell  -WS      Weights/Plates 1 3  -WS      Sets 1 2  -WS      Reps 1 20  -WS      Time (Seconds) 1 5  -WS      Exercise 2    Exercise Name 2 scap retraction, no shoulder ext  -WS      Cueing 2 Verbal;Tactile  -WS      Equipment 2 Theraband  -WS      Resistance 2 Green  -WS      Reps 2 20  -WS      Time (Seconds) 2 30  -WS      Exercise 3    Exercise Name 3 elbow flexion/ext ROM   -WS      Cueing 3 Verbal  -WS      Equipment 3 Dumbell  -WS      Weights/Plates 3 3  -WS      Reps 3 20  -WS      Time (Seconds) 3 30  -WS      Exercise 6    Exercise Name 6 AAROM abduction with cane  -WS      Cueing 6 Verbal  -WS      Reps 6 20  -WS      Time (Minutes) 6 2  -WS      Time (Seconds) 6 20  -WS      Exercise 7    Exercise Name 7 wash the wall, LUE  -WS      Cueing 7 Verbal  -WS      Sets 7 1  -WS      Reps 7 20  -WS      Exercise 8    Exercise Name 8 chest press, cane, supine  -WS      Cueing 8 Verbal  -WS      Equipment 8 Cuff Weight  -WS      Weights/Plates 8 5  -WS      Sets 8 2  -WS      Reps 8 10  -WS      Exercise 10    Exercise Name 10 FIR with towel  -WS      Cueing 10 Tactile  -WS      Sets 10 1  -WS      Reps 10 5  -WS      Time (Seconds) 10 10  -WS      Exercise 11    Exercise Name 11 prone shoulder ext  -WS      Cueing 11 Verbal  -WS       Equipment 11 Dumbell  -WS      Weights/Plates 11 2  -WS      Resistance 11 Blue  -WS      Sets 11 2  -WS      Reps 11 10  -WS      Time (Seconds) 11 5 sec  -WS      Exercise 12    Exercise Name 12 supine clock, horizontal/diagonals   -WS      Cueing 12 Verbal  -WS      Equipment 12 Theraband  -WS      Resistance 12 Red  -WS      Sets 12 2  -WS      Reps 12 10  -WS      Exercise 13    Exercise Name 13 SL ER, towel roll   -WS      Cueing 13 Tactile  -WS      Equipment 13 Dumbell  -WS      Weights/Plates 13 2  -WS      Sets 13 2  -WS      Reps 13 10  -WS      Exercise 14    Exercise Name 14 prone row to neutral  -WS      Cueing 14 Tactile  -WS      Equipment 14 Dumbell  -WS      Weights/Plates 14 2  -WS      Sets 14 2  -WS      Reps 14 10  -WS      Exercise 15    Exercise Name 15 walk the wall  -WS      Cueing 15 Demo  -WS      Resistance 15 Yellow  -WS      Sets 15 2  -WS      Reps 15 5  -WS        User Key  (r) = Recorded By, (t) = Taken By, (c) = Cosigned By    Initials Name Provider Type     José Miguel Chairez PTA Physical Therapy Assistant                        Manual Rx (last 36 hours)      Manual Treatments       02/06/17 0833          Manual Rx 1    Manual Rx 1 Location gentle inferior mobs L shoulder/oscillations for relaxation, pt. supine  -WS      Manual Rx 2    Manual Rx 2 Location PROM L shoulder, pt. supine, into flexion, abduction, ER, IR   -WS      Manual Rx 3    Manual Rx 3 Location slow reversal L ER/IR, 90 abd, POS, supine   -WS      Manual Rx 4    Manual Rx 4 Location slow reversal L shoulder D2, supine   -WS        User Key  (r) = Recorded By, (t) = Taken By, (c) = Cosigned By    Initials Name Provider Type     José Miguel Chairez PTA Physical Therapy Assistant                PT OP Goals       02/06/17 0800 02/01/17 0800       PT Short Term Goals    STG Date to Achieve 11/29/16  -WS 11/29/16  -WS     STG 1 pt. to be I with initial HEP to facilitate self management of their condition  - pt. to  be I with initial HEP to facilitate self management of their condition  -WS     STG 1 Progress Met  -WS Met  -WS     STG 2 pt. to be educated in/verbalize understanding of the importance of posture/ergonomics in association with their condition to facilitate self management of their condition  -WS pt. to be educated in/verbalize understanding of the importance of posture/ergonomics in association with their condition to facilitate self management of their condition  -WS     STG 2 Progress Met  -WS Met  -WS     STG 3 pt. to demonstrate L shoulder flexion PROM >/= 0-120 to facilitate ease of performing self care activities  -WS pt. to demonstrate L shoulder flexion PROM >/= 0-120 to facilitate ease of performing self care activities  -WS     STG 3 Progress Met  -WS Met  -WS     STG 4 pt. to demonstrate L shoulder PROM ER >/= 0-45 to facilitate ease of performing self care activities  -WS pt. to demonstrate L shoulder PROM ER >/= 0-45 to facilitate ease of performing self care activities  -WS     STG 4 Progress Met  -WS Met  -WS     STG 5 pt. to report sleeping through the night without interruption secondary to her L shoulder pain to facilitate optimal healing   -WS pt. to report sleeping through the night without interruption secondary to her L shoulder pain to facilitate optimal healing   -WS     STG 5 Progress Partially Met  -WS Partially Met  -WS     Long Term Goals    LTG Date to Achieve 02/13/17  -WS 02/13/17  -WS     LTG 1 pt. to be I with advanced HEP to facilitate self management of their condition  -WS pt. to be I with advanced HEP to facilitate self management of their condition  -WS     LTG 1 Progress Ongoing  -WS Ongoing  -WS     LTG 2 pt. to report a DASH </= 20% to demonstrate decreased level of perceived disability  -WS pt. to report a DASH </= 20% to demonstrate decreased level of perceived disability  -WS     LTG 2 Progress Ongoing  -WS Ongoing  -WS     LTG 3 pt. to demonstrate L shoulder AROM FIR  >/= mid line belt line to facilitate ease of performing self care/dressing activities  -WS pt. to demonstrate L shoulder AROM FIR >/= mid line belt line to facilitate ease of performing self care/dressing activities  -WS     LTG 3 Progress Met  -WS Met  -WS     LTG 4 pt. to demonstrate L shoulder flexion AROM >/= 0-120 to facilitate ease of performing functional/household activities  -WS pt. to demonstrate L shoulder flexion AROM >/= 0-120 to facilitate ease of performing functional/household activities  -WS     LTG 4 Progress Partially Met  -WS Partially Met  -WS     LTG 5 pt. to demonstrate placing/retrieving small object from an above the shoulder level shelf with her LUE to facilitate ease of performing household/work related activities  -WS pt. to demonstrate placing/retrieving small object from an above the shoulder level shelf with her LUE to facilitate ease of performing household/work related activities  -WS     LTG 5 Progress Ongoing  -WS Ongoing  -WS     LTG 6 pt. to report initiating personal fitness program at [facility] to faciliate optimal carry over and improved overall health  -WS pt. to report initiating personal fitness program at [facility] to faciliate optimal carry over and improved overall health  -WS     LTG 6 Progress Met  -WS Met  -WS       User Key  (r) = Recorded By, (t) = Taken By, (c) = Cosigned By    Initials Name Provider Type    ABISAI Chairez PTA Physical Therapy Assistant                Therapy Education       02/06/17 0834    Therapy Education    Given Pain management;Symptoms/condition management;Posture/body mechanics;HEP  -WS    Program Reinforced  -WS    How Provided Verbal  -WS    Provided to Patient  -WS      02/01/17 0841    Therapy Education    Given HEP;Symptoms/condition management;Pain management;Posture/body mechanics  -WS    Program Reinforced  -WS    How Provided Verbal  -WS    Provided to Patient  -WS      User Key  (r) = Recorded By, (t) = Taken By, (c) =  Cosigned By    Initials Name Provider Type    WS José Miguel Chairez PTA Physical Therapy Assistant                Time Calculation:   Start Time: 0800  Stop Time: 0850  Time Calculation (min): 50 min    Therapy Charges for Today     Code Description Service Date Service Provider Modifiers Qty    93022941427 HC PT THER PROC EA 15 MIN 2/6/2017 José Miguel Chairez PTA GP 2    11774802028 HC PT MANUAL THERAPY EA 15 MIN 2/6/2017 José Miguel Chairez PTA GP 1    29266518283 HC PT HOT OR COLD PACK TREAT [facility] 2/6/2017 José Miguel Chairez PTA GP 1                    José Miguel Chairez PTA  2/6/2017

## 2017-02-08 ENCOUNTER — HOSPITAL ENCOUNTER (OUTPATIENT)
Dept: PHYSICAL THERAPY | Facility: HOSPITAL | Age: 52
Setting detail: THERAPIES SERIES
Discharge: HOME | End: 2017-02-08

## 2017-02-08 DIAGNOSIS — Z98.890 S/P ROTATOR CUFF REPAIR: ICD-10-CM

## 2017-02-08 DIAGNOSIS — M17.0 PRIMARY OSTEOARTHRITIS OF BOTH KNEES: ICD-10-CM

## 2017-02-08 DIAGNOSIS — Z47.89 ORTHOPEDIC AFTERCARE: Primary | ICD-10-CM

## 2017-02-08 DIAGNOSIS — M25.661 LIMITATION OF JOINT MOTION OF KNEE, RIGHT: ICD-10-CM

## 2017-02-08 DIAGNOSIS — M25.662 KNEE STIFFNESS, LEFT: ICD-10-CM

## 2017-02-08 DIAGNOSIS — Z74.09 IMPAIRED MOBILITY: ICD-10-CM

## 2017-02-08 PROCEDURE — 97140 MANUAL THERAPY 1/> REGIONS: CPT

## 2017-02-08 PROCEDURE — 97110 THERAPEUTIC EXERCISES: CPT

## 2017-02-08 NOTE — PROGRESS NOTES
Outpatient Physical Therapy Ortho Treatment Note  [facility]     Patient Name: Korina Raygoza  : 1965  MRN: [id number]  Today's Date: 2017      Visit Date: 2017    Visit Dx:    ICD-10-CM ICD-9-CM   1. Orthopedic aftercare Z47.89 V54.9   2. S/P rotator cuff repair Z98.890 V45.89   3. Impaired mobility Z74.09 799.89   4. Knee stiffness, left M25.662 719.56   5. Limitation of joint motion of knee, right M25.661 719.56   6. Primary osteoarthritis of both knees M17.0 715.16       Patient Active Problem List   Diagnosis   • Tear of medial meniscus of knee   • Osteoarthrosis, localized, primary, knee   • Knee pain, bilateral   • Tendonitis, Achilles, right   • Rotator cuff tendinitis   • Elbow laceration   • Complete tear of left rotator cuff        Past Medical History   Diagnosis Date   • Arthritis      KNEES   • Asthma    • History of herpes zoster    • Meniscus tear      LEFT - CURRENT   • Rosacea    • Rotator cuff tear         Past Surgical History   Procedure Laterality Date   • Mole removal  2013   • Knee surgery     • Shoulder arthroscopy w/ rotator cuff repair Left 10/18/2016     Procedure: SHOULDER ARTHROSCOPY WITH ROTATOR CUFF REPAIR AND LABRAL DEBRIDEMENT;  Surgeon: William Bowman MD;  Location: [facility] OR [facility] [place];  Service:                              PT Assessment/Plan       17 0842 17 0834       PT Assessment    Assessment Comments Patient was pain free last week but since pushing the printer has a small set back. Pain is still low. Added pull downw  to exercises  -WS No pain today. Working on increasing functional strength and endurance  -WS       User Key  (r) = Recorded By, (t) = Taken By, (c) = Cosigned By    Initials Name Provider Type    WS José Miguel Chairez PTA Physical Therapy Assistant                Modalities       17 0800          Ice    Ice Applied Yes  -WS      Location L shoulder, pt. seated, LUE rested on pillows in lose pack  -WS      Rx Minutes  10 mins  -WS      Ice S/P Rx Yes  -WS        User Key  (r) = Recorded By, (t) = Taken By, (c) = Cosigned By    Initials Name Provider Type    WS José Miguel Chairez PTA Physical Therapy Assistant                Exercises       02/08/17 0800          Subjective Comments    Subjective Comments Having a little pain but not bad. Feel like I went backwards a little.   -WS      Subjective Pain    Able to rate subjective pain? yes  -WS      Pre-Treatment Pain Level 1  -WS      Exercise 1    Exercise Name 1 shoulder shurgs  -WS      Cueing 1 Verbal  -WS      Equipment 1 Dumbell  -WS      Weights/Plates 1 3  -WS      Sets 1 2  -WS      Reps 1 20  -WS      Exercise 2    Exercise Name 2 scap retraction, no shoulder ext  -WS      Cueing 2 Verbal;Tactile  -WS      Equipment 2 Theraband  -WS      Resistance 2 Green  -WS      Reps 2 20  -WS      Exercise 3    Exercise Name 3 elbow flexion/ext ROM   -WS      Cueing 3 Verbal  -WS      Equipment 3 Dumbell  -WS      Weights/Plates 3 3  -WS      Reps 3 20  -WS      Time (Seconds) 3 30  -WS      Exercise 4    Exercise Name 4 seated pull down  -WS      Resistance 4 Green  -WS      Reps 4 15  -WS      Exercise 6    Exercise Name 6 AAROM abduction with cane  -WS      Cueing 6 Verbal  -WS      Reps 6 20  -WS      Time (Minutes) 6 2  -WS      Exercise 7    Exercise Name 7 wash the wall, LUE  -WS      Cueing 7 Verbal  -WS      Sets 7 1  -WS      Reps 7 20  -WS      Exercise 8    Exercise Name 8 chest press, cane, supine  -WS      Cueing 8 Verbal  -WS      Equipment 8 Cuff Weight  -WS      Weights/Plates 8 5  -WS      Sets 8 2  -WS      Reps 8 10  -WS      Exercise 10    Exercise Name 10 FIR with towel  -WS      Cueing 10 Tactile  -WS      Sets 10 1  -WS      Reps 10 5  -WS      Time (Seconds) 10 10  -WS      Exercise 11    Exercise Name 11 prone shoulder ext  -WS      Cueing 11 Verbal  -WS      Equipment 11 Dumbell  -WS      Weights/Plates 11 2  -WS      Sets 11 2  -WS      Reps 11 10  -WS       Exercise 12    Exercise Name 12 supine clock, horizontal/diagonals   -WS      Cueing 12 Verbal  -WS      Equipment 12 Theraband  -WS      Resistance 12 Red  -WS      Sets 12 2  -WS      Reps 12 10  -WS      Exercise 13    Exercise Name 13 SL ER, towel roll   -WS      Cueing 13 Tactile  -WS      Equipment 13 Dumbell  -WS      Weights/Plates 13 2  -WS      Sets 13 2  -WS      Reps 13 10  -WS      Exercise 14    Exercise Name 14 prone row to neutral  -WS      Cueing 14 Tactile  -WS      Equipment 14 Dumbell  -WS      Weights/Plates 14 2  -WS      Sets 14 2  -WS      Reps 14 10  -WS      Exercise 15    Exercise Name 15 walk the wall  -WS      Cueing 15 Demo  -WS      Resistance 15 Yellow  -WS      Sets 15 2  -WS      Reps 15 5  -WS        User Key  (r) = Recorded By, (t) = Taken By, (c) = Cosigned By    Initials Name Provider Type    WS José Miguel Chairez PTA Physical Therapy Assistant                        Manual Rx (last 36 hours)      Manual Treatments       02/08/17 0839          Manual Rx 1    Manual Rx 1 Location gentle inferior mobs L shoulder/oscillations for relaxation, pt. supine  -WS      Manual Rx 2    Manual Rx 2 Location PROM L shoulder, pt. supine, into flexion, abduction, ER, IR   -WS      Manual Rx 3    Manual Rx 3 Location slow reversal L ER/IR, 90 abd, POS, supine   -WS      Manual Rx 4    Manual Rx 4 Location slow reversal L shoulder D2, supine   -WS      Manual Rx 5    Manual Rx 5 Location shoulder abd resistive to 90 while supine  -WS        User Key  (r) = Recorded By, (t) = Taken By, (c) = Cosigned By    Initials Name Provider Type     José Miguel Chairez PTA Physical Therapy Assistant                PT OP Goals       02/08/17 0800 02/06/17 0800 02/01/17 0800    PT Short Term Goals    STG Date to Achieve 11/29/16  -WS 11/29/16  -WS 11/29/16  -WS    STG 1 pt. to be I with initial HEP to facilitate self management of their condition  -WS pt. to be I with initial HEP to facilitate self management of  their condition  -WS pt. to be I with initial HEP to facilitate self management of their condition  -WS    STG 1 Progress Met  -WS Met  -WS Met  -WS    STG 2 pt. to be educated in/verbalize understanding of the importance of posture/ergonomics in association with their condition to facilitate self management of their condition  -WS pt. to be educated in/verbalize understanding of the importance of posture/ergonomics in association with their condition to facilitate self management of their condition  -WS pt. to be educated in/verbalize understanding of the importance of posture/ergonomics in association with their condition to facilitate self management of their condition  -WS    STG 2 Progress Met  -WS Met  -WS Met  -WS    STG 3 pt. to demonstrate L shoulder flexion PROM >/= 0-120 to facilitate ease of performing self care activities  -WS pt. to demonstrate L shoulder flexion PROM >/= 0-120 to facilitate ease of performing self care activities  -WS pt. to demonstrate L shoulder flexion PROM >/= 0-120 to facilitate ease of performing self care activities  -WS    STG 3 Progress Met  -WS Met  -WS Met  -WS    STG 4 pt. to demonstrate L shoulder PROM ER >/= 0-45 to facilitate ease of performing self care activities  -WS pt. to demonstrate L shoulder PROM ER >/= 0-45 to facilitate ease of performing self care activities  -WS pt. to demonstrate L shoulder PROM ER >/= 0-45 to facilitate ease of performing self care activities  -WS    STG 4 Progress Met  -WS Met  -WS Met  -WS    STG 5 pt. to report sleeping through the night without interruption secondary to her L shoulder pain to facilitate optimal healing   -WS pt. to report sleeping through the night without interruption secondary to her L shoulder pain to facilitate optimal healing   -WS pt. to report sleeping through the night without interruption secondary to her L shoulder pain to facilitate optimal healing   -WS    STG 5 Progress Partially Met  -WS Partially Met   -WS Partially Met  -WS    Long Term Goals    LTG Date to Achieve 02/13/17  -WS 02/13/17  -WS 02/13/17  -WS    LTG 1 pt. to be I with advanced HEP to facilitate self management of their condition  -WS pt. to be I with advanced HEP to facilitate self management of their condition  -WS pt. to be I with advanced HEP to facilitate self management of their condition  -WS    LTG 1 Progress Ongoing  -WS Ongoing  -WS Ongoing  -WS    LTG 2 pt. to report a DASH </= 20% to demonstrate decreased level of perceived disability  -WS pt. to report a DASH </= 20% to demonstrate decreased level of perceived disability  -WS pt. to report a DASH </= 20% to demonstrate decreased level of perceived disability  -WS    LTG 2 Progress Ongoing  -WS Ongoing  -WS Ongoing  -WS    LTG 3 pt. to demonstrate L shoulder AROM FIR >/= mid line belt line to facilitate ease of performing self care/dressing activities  -WS pt. to demonstrate L shoulder AROM FIR >/= mid line belt line to facilitate ease of performing self care/dressing activities  -WS pt. to demonstrate L shoulder AROM FIR >/= mid line belt line to facilitate ease of performing self care/dressing activities  -WS    LTG 3 Progress Met  -WS Met  -WS Met  -WS    LTG 4 pt. to demonstrate L shoulder flexion AROM >/= 0-120 to facilitate ease of performing functional/household activities  -WS pt. to demonstrate L shoulder flexion AROM >/= 0-120 to facilitate ease of performing functional/household activities  -WS pt. to demonstrate L shoulder flexion AROM >/= 0-120 to facilitate ease of performing functional/household activities  -WS    LTG 4 Progress Partially Met  -WS Partially Met  -WS Partially Met  -WS    LTG 5 pt. to demonstrate placing/retrieving small object from an above the shoulder level shelf with her LUE to facilitate ease of performing household/work related activities  -WS pt. to demonstrate placing/retrieving small object from an above the shoulder level shelf with her LUE to  facilitate ease of performing household/work related activities  -WS pt. to demonstrate placing/retrieving small object from an above the shoulder level shelf with her LUE to facilitate ease of performing household/work related activities  -WS    LTG 5 Progress Ongoing  -WS Ongoing  -WS Ongoing  -WS    LTG 6 pt. to report initiating personal fitness program at [facility] to faciliate optimal carry over and improved overall health  -WS pt. to report initiating personal fitness program at [facility] to faciliate optimal carry over and improved overall health  -WS pt. to report initiating personal fitness program at [facility] to faciliate optimal carry over and improved overall health  -WS    LTG 6 Progress Met  -WS Met  -WS Met  -WS      User Key  (r) = Recorded By, (t) = Taken By, (c) = Cosigned By    Initials Name Provider Type    ABISAI Chairez PTA Physical Therapy Assistant                Therapy Education       02/08/17 0841    Therapy Education    Given HEP;Symptoms/condition management;Pain management  -WS    Program Reinforced  -WS    How Provided Verbal  -WS    Provided to Patient  -WS      02/06/17 0834    Therapy Education    Given Pain management;Symptoms/condition management;Posture/body mechanics;HEP  -WS    Program Reinforced  -WS    How Provided Verbal  -WS    Provided to Patient  -WS      User Key  (r) = Recorded By, (t) = Taken By, (c) = Cosigned By    Initials Name Provider Type    ABISAI Chairez PTA Physical Therapy Assistant                Time Calculation:   Start Time: 0800  Stop Time: 0900  Time Calculation (min): 60 min    Therapy Charges for Today     Code Description Service Date Service Provider Modifiers Qty    66053453147 HC PT THER PROC EA 15 MIN 2/8/2017 José Miguel Chairez PTA GP 2    99955036816 HC PT MANUAL THERAPY EA 15 MIN 2/8/2017 José Miguel Chairez PTA GP 1    64765417837 HC PT HOT OR COLD PACK TREAT [facility] 2/8/2017 José Miguel Chairez PTA GP 1                    José Miguel  Mihaela, SKYLAR  2/8/2017

## 2017-02-13 ENCOUNTER — APPOINTMENT (OUTPATIENT)
Dept: PHYSICAL THERAPY | Facility: HOSPITAL | Age: 52
End: 2017-02-13

## 2017-02-15 ENCOUNTER — HOSPITAL ENCOUNTER (OUTPATIENT)
Dept: PHYSICAL THERAPY | Facility: HOSPITAL | Age: 52
Setting detail: THERAPIES SERIES
Discharge: HOME | End: 2017-02-15

## 2017-02-15 DIAGNOSIS — Z47.89 ORTHOPEDIC AFTERCARE: Primary | ICD-10-CM

## 2017-02-15 DIAGNOSIS — M25.662 KNEE STIFFNESS, LEFT: ICD-10-CM

## 2017-02-15 DIAGNOSIS — Z74.09 IMPAIRED MOBILITY: ICD-10-CM

## 2017-02-15 DIAGNOSIS — M25.661 LIMITATION OF JOINT MOTION OF KNEE, RIGHT: ICD-10-CM

## 2017-02-15 DIAGNOSIS — Z98.890 S/P ROTATOR CUFF REPAIR: ICD-10-CM

## 2017-02-15 DIAGNOSIS — M17.0 PRIMARY OSTEOARTHRITIS OF BOTH KNEES: ICD-10-CM

## 2017-02-15 PROCEDURE — 97140 MANUAL THERAPY 1/> REGIONS: CPT

## 2017-02-15 PROCEDURE — 97110 THERAPEUTIC EXERCISES: CPT

## 2017-02-15 NOTE — PROGRESS NOTES
Outpatient Physical Therapy Ortho Treatment Note  [facility]     Patient Name: Korina Raygoza  : 1965  MRN: [id number]  Today's Date: 2/15/2017      Visit Date: 02/15/2017    Visit Dx:    ICD-10-CM ICD-9-CM   1. Orthopedic aftercare Z47.89 V54.9   2. S/P rotator cuff repair Z98.890 V45.89   3. Impaired mobility Z74.09 799.89   4. Knee stiffness, left M25.662 719.56   5. Limitation of joint motion of knee, right M25.661 719.56   6. Primary osteoarthritis of both knees M17.0 715.16       Patient Active Problem List   Diagnosis   • Tear of medial meniscus of knee   • Osteoarthrosis, localized, primary, knee   • Knee pain, bilateral   • Tendonitis, Achilles, right   • Rotator cuff tendinitis   • Elbow laceration   • Complete tear of left rotator cuff        Past Medical History   Diagnosis Date   • Arthritis      KNEES   • Asthma    • History of herpes zoster    • Meniscus tear      LEFT - CURRENT   • Rosacea    • Rotator cuff tear         Past Surgical History   Procedure Laterality Date   • Mole removal  2013   • Knee surgery     • Shoulder arthroscopy w/ rotator cuff repair Left 10/18/2016     Procedure: SHOULDER ARTHROSCOPY WITH ROTATOR CUFF REPAIR AND LABRAL DEBRIDEMENT;  Surgeon: William Bowman MD;  [place] [facility] [place] [facility] [place]  Service:                              PT Assessment/Plan       02/15/17 0845          PT Assessment    Assessment Comments Pain is well controlled. Strength and endurance continue to improve. See MD friday  -        User Key  (r) = Recorded By, (t) = Taken By, (c) = Cosigned By    Initials Name Provider Type    ABISAI Chairez PTA Physical Therapy Assistant                Modalities       02/15/17 0800          Ice    Ice Applied Yes  -WS      Location L shoulder, pt. seated, LUE rested on pillows in lose pack  -WS      Rx Minutes 10 mins  -WS      Ice S/P Rx Yes  -WS        User Key  (r) = Recorded By, (t) = Taken By, (c) = Cosigned By    Initials Name Provider  Type    WS José Miguel Chairez PTA Physical Therapy Assistant                Exercises       02/15/17 0800          Subjective Comments    Subjective Comments No pain today  -WS      Subjective Pain    Able to rate subjective pain? yes  -WS      Pre-Treatment Pain Level 0  -WS      Post-Treatment Pain Level 1  -WS      Subjective Pain Comment Pain is intermittent  -WS      Exercise 1    Exercise Name 1 shoulder shurgs  -WS      Cueing 1 Verbal  -WS      Equipment 1 Dumbell  -WS      Weights/Plates 1 3  -WS      Sets 1 2  -WS      Reps 1 20  -WS      Exercise 2    Exercise Name 2 scap retraction, no shoulder ext  -WS      Cueing 2 Verbal;Tactile  -WS      Equipment 2 Theraband  -WS      Resistance 2 Green  -WS      Reps 2 20  -WS      Time (Seconds) 2 --  -WS      Exercise 3    Exercise Name 3 elbow flexion/ext ROM   -WS      Cueing 3 Verbal  -WS      Equipment 3 Dumbell  -WS      Weights/Plates 3 3  -WS      Reps 3 20  -WS      Time (Seconds) 3 --  -WS      Exercise 4    Exercise Name 4 seated pull down  -WS      Cueing 4 Verbal  -WS      Equipment 4 --   belt  -WS      Resistance 4 Green  -WS      Reps 4 15  -WS      Exercise 6    Exercise Name 6 AAROM abduction with cane  -WS      Cueing 6 Verbal  -WS      Reps 6 20  -WS      Time (Minutes) 6 2  -WS      Time (Seconds) 6 --  -WS      Exercise 7    Exercise Name 7 wash the wall, LUE  -WS      Cueing 7 Verbal  -WS      Sets 7 1  -WS      Reps 7 20  -WS      Time (Seconds) 7 --  -WS      Exercise 8    Exercise Name 8 chest press, cane, supine  -WS      Cueing 8 Verbal  -WS      Equipment 8 Cuff Weight  -WS      Weights/Plates 8 5  -WS      Sets 8 2  -WS      Reps 8 10  -WS      Exercise 10    Exercise Name 10 FIR with towel  -WS      Cueing 10 Tactile  -WS      Sets 10 1  -WS      Reps 10 5  -WS      Time (Seconds) 10 10  -WS      Exercise 11    Exercise Name 11 prone shoulder ext  -WS      Cueing 11 Verbal  -WS      Equipment 11 Dumbell  -WS      Weights/Plates 11 2   -WS      Sets 11 2  -WS      Reps 11 10  -WS      Time (Seconds) 11 --  -WS      Exercise 12    Exercise Name 12 supine clock, horizontal/diagonals   -WS      Cueing 12 Verbal  -WS      Equipment 12 Theraband  -WS      Resistance 12 Red  -WS      Sets 12 2  -WS      Reps 12 10  -WS      Exercise 13    Exercise Name 13 SL ER, towel roll   -WS      Cueing 13 Tactile  -WS      Equipment 13 Dumbell  -WS      Weights/Plates 13 2  -WS      Sets 13 2  -WS      Reps 13 10  -WS      Exercise 14    Exercise Name 14 prone row to neutral  -WS      Cueing 14 Tactile  -WS      Equipment 14 Dumbell  -WS      Weights/Plates 14 2  -WS      Sets 14 2  -WS      Reps 14 10  -WS      Time (Seconds) 14 10  -WS      Exercise 15    Exercise Name 15 walk the wall  -WS      Cueing 15 Demo  -WS      Resistance 15 Yellow  -WS      Sets 15 2  -WS      Reps 15 5  -WS        User Key  (r) = Recorded By, (t) = Taken By, (c) = Cosigned By    Initials Name Provider Type    WS José Miguel Chairez PTA Physical Therapy Assistant                        Manual Rx (last 36 hours)      Manual Treatments       02/15/17 0844          Manual Rx 1    Manual Rx 1 Location gentle inferior mobs L shoulder/oscillations for relaxation, pt. supine  -WS      Manual Rx 2    Manual Rx 2 Location PROM L shoulder, pt. supine, into flexion, abduction, ER, IR   -WS      Manual Rx 3    Manual Rx 3 Location slow reversal L ER/IR, 90 abd, POS, supine   -WS      Manual Rx 4    Manual Rx 4 Location slow reversal L shoulder D2, supine   -WS      Manual Rx 5    Manual Rx 5 Location shoulder abd resistive to 90 while supine  -WS        User Key  (r) = Recorded By, (t) = Taken By, (c) = Cosigned By    Initials Name Provider Type     José Miguel Chairez PTA Physical Therapy Assistant                PT OP Goals       02/15/17 0800 02/08/17 0800 02/06/17 0800    PT Short Term Goals    STG Date to Achieve 11/29/16  -WS 11/29/16  -WS 11/29/16  -WS    STG 1 pt. to be I with initial HEP to  facilitate self management of their condition  -WS pt. to be I with initial HEP to facilitate self management of their condition  -WS pt. to be I with initial HEP to facilitate self management of their condition  -WS    STG 1 Progress Met  -WS Met  -WS Met  -WS    STG 2 pt. to be educated in/verbalize understanding of the importance of posture/ergonomics in association with their condition to facilitate self management of their condition  -WS pt. to be educated in/verbalize understanding of the importance of posture/ergonomics in association with their condition to facilitate self management of their condition  -WS pt. to be educated in/verbalize understanding of the importance of posture/ergonomics in association with their condition to facilitate self management of their condition  -WS    STG 2 Progress Met  -WS Met  -WS Met  -WS    STG 3 pt. to demonstrate L shoulder flexion PROM >/= 0-120 to facilitate ease of performing self care activities  -WS pt. to demonstrate L shoulder flexion PROM >/= 0-120 to facilitate ease of performing self care activities  -WS pt. to demonstrate L shoulder flexion PROM >/= 0-120 to facilitate ease of performing self care activities  -WS    STG 3 Progress Met  -WS Met  -WS Met  -WS    STG 4 pt. to demonstrate L shoulder PROM ER >/= 0-45 to facilitate ease of performing self care activities  -WS pt. to demonstrate L shoulder PROM ER >/= 0-45 to facilitate ease of performing self care activities  -WS pt. to demonstrate L shoulder PROM ER >/= 0-45 to facilitate ease of performing self care activities  -WS    STG 4 Progress Met  -WS Met  -WS Met  -WS    STG 5 pt. to report sleeping through the night without interruption secondary to her L shoulder pain to facilitate optimal healing   -WS pt. to report sleeping through the night without interruption secondary to her L shoulder pain to facilitate optimal healing   -WS pt. to report sleeping through the night without interruption secondary  to her L shoulder pain to facilitate optimal healing   -WS    STG 5 Progress Partially Met  -WS Partially Met  -WS Partially Met  -WS    Long Term Goals    LTG Date to Achieve 02/13/17  -WS 02/13/17  -WS 02/13/17  -WS    LTG 1 pt. to be I with advanced HEP to facilitate self management of their condition  -WS pt. to be I with advanced HEP to facilitate self management of their condition  -WS pt. to be I with advanced HEP to facilitate self management of their condition  -WS    LTG 1 Progress Ongoing  -WS Ongoing  -WS Ongoing  -WS    LTG 2 pt. to report a DASH </= 20% to demonstrate decreased level of perceived disability  -WS pt. to report a DASH </= 20% to demonstrate decreased level of perceived disability  -WS pt. to report a DASH </= 20% to demonstrate decreased level of perceived disability  -WS    LTG 2 Progress Ongoing  -WS Ongoing  -WS Ongoing  -WS    LTG 3 pt. to demonstrate L shoulder AROM FIR >/= mid line belt line to facilitate ease of performing self care/dressing activities  -WS pt. to demonstrate L shoulder AROM FIR >/= mid line belt line to facilitate ease of performing self care/dressing activities  -WS pt. to demonstrate L shoulder AROM FIR >/= mid line belt line to facilitate ease of performing self care/dressing activities  -WS    LTG 3 Progress Met  -WS Met  -WS Met  -WS    LTG 4 pt. to demonstrate L shoulder flexion AROM >/= 0-120 to facilitate ease of performing functional/household activities  -WS pt. to demonstrate L shoulder flexion AROM >/= 0-120 to facilitate ease of performing functional/household activities  -WS pt. to demonstrate L shoulder flexion AROM >/= 0-120 to facilitate ease of performing functional/household activities  -WS    LTG 4 Progress Partially Met  -WS Partially Met  -WS Partially Met  -WS    LTG 5 pt. to demonstrate placing/retrieving small object from an above the shoulder level shelf with her LUE to facilitate ease of performing household/work related activities  -WS  pt. to demonstrate placing/retrieving small object from an above the shoulder level shelf with her LUE to facilitate ease of performing household/work related activities  -WS pt. to demonstrate placing/retrieving small object from an above the shoulder level shelf with her LUE to facilitate ease of performing household/work related activities  -WS    LTG 5 Progress Ongoing  -WS Ongoing  -WS Ongoing  -WS    LTG 6 pt. to report initiating personal fitness program at [facility] to faciliate optimal carry over and improved overall health  -WS pt. to report initiating personal fitness program at [facility] to faciliate optimal carry over and improved overall health  -WS pt. to report initiating personal fitness program at [facility] to faciliate optimal carry over and improved overall health  -WS    LTG 6 Progress Met  -WS Met  -WS Met  -WS      User Key  (r) = Recorded By, (t) = Taken By, (c) = Cosigned By    Initials Name Provider Type    ABISAI Chairez PTA Physical Therapy Assistant                Therapy Education       02/15/17 0844    Therapy Education    Given Posture/body mechanics;Pain management;HEP  -WS    Program Reinforced  -WS    How Provided Verbal  -WS    Provided to Patient  -WS      User Key  (r) = Recorded By, (t) = Taken By, (c) = Cosigned By    Initials Name Provider Type    ABISAI Chairez PTA Physical Therapy Assistant                Time Calculation:   Start Time: 0800  Stop Time: 0900  Time Calculation (min): 60 min    Therapy Charges for Today     Code Description Service Date Service Provider Modifiers Qty    01369626265 HC PT THER PROC EA 15 MIN 2/15/2017 José Miguel Chairez PTA GP 2    45933608883 HC PT MANUAL THERAPY EA 15 MIN 2/15/2017 José Miguel Chairez PTA GP 1    52200465299 HC PT HOT OR COLD PACK TREAT MC[facility] 2/15/2017 José Miguel Chairez PTA GP 1                    José Miguel Chairez PTA  2/15/2017

## 2017-02-17 ENCOUNTER — OFFICE VISIT (OUTPATIENT)
Dept: ORTHOPEDIC SURGERY | Facility: CLINIC | Age: 52
End: 2017-02-17

## 2017-02-17 VITALS — BODY MASS INDEX: 31.71 KG/M2 | HEIGHT: 67 IN | TEMPERATURE: 97.5 F | WEIGHT: 202 LBS

## 2017-02-17 DIAGNOSIS — Z09 SURGERY FOLLOW-UP: Primary | ICD-10-CM

## 2017-02-17 PROCEDURE — 99212 OFFICE O/P EST SF 10 MIN: CPT | Performed by: ORTHOPAEDIC SURGERY

## 2017-02-17 NOTE — PROGRESS NOTES
.Chief Complaint:  4.5 months s/p left rotator cuff repair    HPI:  Korina comes in today for follow up.  She says that she has really turned a corner with therapy.  She is off the pain medicine altogether now.  She is really happy with her progress but still has some residual weakness that bothers her.  She still has some soreness but denies any significant pain.      Exam:  Her incisions are healed.  Motion is near full relative to the contralateral side.  She still lacks a few levels of internal rotation and maybe 5-10° of elevation.  She is able to elevate through mid-arc's caption without any discomfort.  She still has weakness with elevation and abduction on exam.    Imaging:  None    Assessment:  4.5 months s/p left rotator cuff repair    Plan:  I have encouraged her to continue the therapy.  She seems to be doing much better than when I last saw her.  I want to see her back in another 2 months.  I have encouraged her that she can expect to experience improvement in her strength, function, motion, and overall pain for up to one year from surgery.    William Bowman MD  02/17/2017

## 2017-02-20 ENCOUNTER — HOSPITAL ENCOUNTER (OUTPATIENT)
Dept: PHYSICAL THERAPY | Facility: HOSPITAL | Age: 52
Setting detail: THERAPIES SERIES
Discharge: HOME | End: 2017-02-20

## 2017-02-20 PROCEDURE — 97140 MANUAL THERAPY 1/> REGIONS: CPT

## 2017-02-20 PROCEDURE — 97110 THERAPEUTIC EXERCISES: CPT

## 2017-02-20 NOTE — PROGRESS NOTES
Outpatient Physical Therapy Ortho Treatment Note  [facility]     Patient Name: Korina Raygoza  : 1965  MRN: [id number]  Today's Date: 2017      Visit Date: 2017    Visit Dx:  No diagnosis found.    Patient Active Problem List   Diagnosis   • Tear of medial meniscus of knee   • Osteoarthrosis, localized, primary, knee   • Knee pain, bilateral   • Tendonitis, Achilles, right   • Rotator cuff tendinitis   • Elbow laceration   • Complete tear of left rotator cuff        Past Medical History   Diagnosis Date   • Arthritis      KNEES   • Asthma    • History of herpes zoster    • Meniscus tear      LEFT - CURRENT   • Rosacea    • Rotator cuff tear         Past Surgical History   Procedure Laterality Date   • Mole removal  2013   • Knee surgery     • Shoulder arthroscopy w/ rotator cuff repair Left 10/18/2016     Procedure: SHOULDER ARTHROSCOPY WITH ROTATOR CUFF REPAIR AND LABRAL DEBRIDEMENT;  Surgeon: William Bowman MD;  [place] [facility] [place] [facility] [place]  Service:                              PT Assessment/Plan       17 0839 02/15/17 0845       PT Assessment    Assessment Comments Continue to have sleep interuptions. Pain is well controlled. Continue to work on functional strengthening/stability of left scap/RTC  -WS Pain is well controlled. Strength and endurance continue to improve. See MD friday  -WS       User Key  (r) = Recorded By, (t) = Taken By, (c) = Cosigned By    Initials Name Provider Type    ABISAI Chairez PTA Physical Therapy Assistant                Modalities       17 0800          Ice    Ice Applied Yes  -WS      Location L shoulder, pt. seated, LUE rested on pillows in lose pack  -WS      Rx Minutes 10 mins  -WS      Ice S/P Rx Yes  -WS        User Key  (r) = Recorded By, (t) = Taken By, (c) = Cosigned By    Initials Name Provider Type    ABISAI Chairez PTA Physical Therapy Assistant                Exercises       17 0800          Subjective Comments     Subjective Comments MD visit went well. Have no pain starting therapy today.   -WS      Subjective Pain    Able to rate subjective pain? no  -WS      Pre-Treatment Pain Level 0  -WS      Exercise 1    Exercise Name 1 shoulder shurgs  -WS      Cueing 1 Verbal  -WS      Equipment 1 Dumbell  -WS      Weights/Plates 1 3  -WS      Sets 1 2  -WS      Reps 1 20  -WS      Exercise 2    Exercise Name 2 scap retraction, no shoulder ext  -WS      Cueing 2 Verbal;Tactile  -WS      Equipment 2 Theraband  -WS      Resistance 2 Green  -WS      Reps 2 20  -WS      Exercise 3    Exercise Name 3 elbow flexion/ext ROM   -WS      Cueing 3 Verbal  -WS      Equipment 3 Dumbell  -WS      Weights/Plates 3 3  -WS      Reps 3 20  -WS      Exercise 4    Exercise Name 4 seated pull down  -WS      Cueing 4 Verbal  -WS      Equipment 4 --   belt  -WS      Resistance 4 Green  -WS      Reps 4 15  -WS      Exercise 5    Sets 5 2  -WS      Reps 5 10  -WS      Exercise 6    Exercise Name 6 AAROM abduction with cane  -WS      Cueing 6 Verbal  -WS      Reps 6 20  -WS      Time (Minutes) 6 2  -WS      Exercise 7    Exercise Name 7 wash the wall, LUE  -WS      Cueing 7 Verbal  -WS      Sets 7 1  -WS      Reps 7 20  -WS      Exercise 8    Exercise Name 8 chest press, cane, supine  -WS      Cueing 8 Verbal  -WS      Equipment 8 Cuff Weight  -WS      Weights/Plates 8 5  -WS      Sets 8 2  -WS      Reps 8 10  -WS      Exercise 10    Exercise Name 10 FIR with towel  -WS      Cueing 10 Tactile  -WS      Sets 10 1  -WS      Reps 10 5  -WS      Time (Seconds) 10 10  -WS      Exercise 11    Exercise Name 11 prone shoulder ext  -WS      Cueing 11 Verbal  -WS      Equipment 11 Dumbell  -WS      Weights/Plates 11 2  -WS      Sets 11 2  -WS      Reps 11 10  -WS      Exercise 12    Exercise Name 12 supine clock, horizontal/diagonals   -WS      Cueing 12 Verbal  -WS      Equipment 12 Theraband  -WS      Sets 12 2  -WS      Reps 12 10  -WS      Exercise 13    Exercise  Name 13 SL ER, towel roll   -WS      Cueing 13 Tactile  -WS      Equipment 13 Dumbell  -WS      Weights/Plates 13 2  -WS      Sets 13 2  -WS      Reps 13 10  -WS      Exercise 14    Exercise Name 14 prone row to neutral  -WS      Cueing 14 Tactile  -WS      Equipment 14 Dumbell  -WS      Weights/Plates 14 2  -WS      Sets 14 2  -WS      Reps 14 10  -WS      Exercise 15    Exercise Name 15 walk the wall  -WS      Cueing 15 Demo  -WS      Resistance 15 Yellow  -WS      Sets 15 2  -WS      Reps 15 5  -WS        User Key  (r) = Recorded By, (t) = Taken By, (c) = Cosigned By    Initials Name Provider Type    WS José Miguel Chairez PTA Physical Therapy Assistant                        Manual Rx (last 36 hours)      Manual Treatments       02/20/17 0838          Manual Rx 1    Manual Rx 1 Location gentle inferior mobs L shoulder/oscillations for relaxation, pt. supine  -WS      Manual Rx 2    Manual Rx 2 Location PROM L shoulder, pt. supine, into flexion, abduction, ER, IR   -WS      Manual Rx 3    Manual Rx 3 Location slow reversal L ER/IR, 90 abd, POS, supine   -WS      Manual Rx 4    Manual Rx 4 Location slow reversal L shoulder D2, supine   -WS      Manual Rx 5    Manual Rx 5 Location shoulder abd resistive to 90 while supine  -WS        User Key  (r) = Recorded By, (t) = Taken By, (c) = Cosigned By    Initials Name Provider Type    WS José Miguel Chairez PTA Physical Therapy Assistant                PT OP Goals       02/20/17 0800 02/15/17 0800 02/08/17 0800    PT Short Term Goals    STG Date to Achieve 11/29/16  -WS 11/29/16  -WS 11/29/16  -WS    STG 1 pt. to be I with initial HEP to facilitate self management of their condition  -WS pt. to be I with initial HEP to facilitate self management of their condition  -WS pt. to be I with initial HEP to facilitate self management of their condition  -WS    STG 1 Progress Met  -WS Met  -WS Met  -WS    STG 2 pt. to be educated in/verbalize understanding of the importance of  posture/ergonomics in association with their condition to facilitate self management of their condition  -WS pt. to be educated in/verbalize understanding of the importance of posture/ergonomics in association with their condition to facilitate self management of their condition  -WS pt. to be educated in/verbalize understanding of the importance of posture/ergonomics in association with their condition to facilitate self management of their condition  -WS    STG 2 Progress Met  -WS Met  -WS Met  -WS    STG 3 pt. to demonstrate L shoulder flexion PROM >/= 0-120 to facilitate ease of performing self care activities  -WS pt. to demonstrate L shoulder flexion PROM >/= 0-120 to facilitate ease of performing self care activities  -WS pt. to demonstrate L shoulder flexion PROM >/= 0-120 to facilitate ease of performing self care activities  -WS    STG 3 Progress Met  -WS Met  -WS Met  -WS    STG 4 pt. to demonstrate L shoulder PROM ER >/= 0-45 to facilitate ease of performing self care activities  -WS pt. to demonstrate L shoulder PROM ER >/= 0-45 to facilitate ease of performing self care activities  -WS pt. to demonstrate L shoulder PROM ER >/= 0-45 to facilitate ease of performing self care activities  -WS    STG 4 Progress Met  -WS Met  -WS Met  -WS    STG 5 pt. to report sleeping through the night without interruption secondary to her L shoulder pain to facilitate optimal healing   -WS pt. to report sleeping through the night without interruption secondary to her L shoulder pain to facilitate optimal healing   -WS pt. to report sleeping through the night without interruption secondary to her L shoulder pain to facilitate optimal healing   -WS    STG 5 Progress Partially Met  -WS Partially Met  -WS Partially Met  -WS    Long Term Goals    LTG Date to Achieve 02/13/17  -WS 02/13/17  -WS 02/13/17  -WS    LTG 1 pt. to be I with advanced HEP to facilitate self management of their condition  -WS pt. to be I with advanced  HEP to facilitate self management of their condition  -WS pt. to be I with advanced HEP to facilitate self management of their condition  -WS    LTG 1 Progress Ongoing  -WS Ongoing  -WS Ongoing  -WS    LTG 2 pt. to report a DASH </= 20% to demonstrate decreased level of perceived disability  -WS pt. to report a DASH </= 20% to demonstrate decreased level of perceived disability  -WS pt. to report a DASH </= 20% to demonstrate decreased level of perceived disability  -WS    LTG 2 Progress Ongoing  -WS Ongoing  -WS Ongoing  -WS    LTG 3 pt. to demonstrate L shoulder AROM FIR >/= mid line belt line to facilitate ease of performing self care/dressing activities  -WS pt. to demonstrate L shoulder AROM FIR >/= mid line belt line to facilitate ease of performing self care/dressing activities  -WS pt. to demonstrate L shoulder AROM FIR >/= mid line belt line to facilitate ease of performing self care/dressing activities  -WS    LTG 3 Progress Met  -WS Met  -WS Met  -WS    LTG 4 pt. to demonstrate L shoulder flexion AROM >/= 0-120 to facilitate ease of performing functional/household activities  -WS pt. to demonstrate L shoulder flexion AROM >/= 0-120 to facilitate ease of performing functional/household activities  -WS pt. to demonstrate L shoulder flexion AROM >/= 0-120 to facilitate ease of performing functional/household activities  -WS    LTG 4 Progress Partially Met  -WS Partially Met  -WS Partially Met  -WS    LTG 5 pt. to demonstrate placing/retrieving small object from an above the shoulder level shelf with her LUE to facilitate ease of performing household/work related activities  -WS pt. to demonstrate placing/retrieving small object from an above the shoulder level shelf with her LUE to facilitate ease of performing household/work related activities  -WS pt. to demonstrate placing/retrieving small object from an above the shoulder level shelf with her LUE to facilitate ease of performing household/work related  activities  -WS    LTG 5 Progress Ongoing  -WS Ongoing  -WS Ongoing  -WS    LTG 6 pt. to report initiating personal fitness program at [facility] to faciliate optimal carry over and improved overall health  -WS pt. to report initiating personal fitness program at [facility] to faciliate optimal carry over and improved overall health  -WS pt. to report initiating personal fitness program at [facility] to faciliate optimal carry over and improved overall health  -WS    LTG 6 Progress Met  -WS Met  -WS Met  -WS      User Key  (r) = Recorded By, (t) = Taken By, (c) = Cosigned By    Initials Name Provider Type    ABISAI Chairez PTA Physical Therapy Assistant                Therapy Education       02/20/17 0838    Therapy Education    Given HEP;Symptoms/condition management;Posture/body mechanics  -WS    Program Reinforced  -WS    How Provided Verbal  -WS    Provided to Patient  -WS      02/15/17 0844    Therapy Education    Given Posture/body mechanics;Pain management;HEP  -WS    Program Reinforced  -WS    How Provided Verbal  -WS    Provided to Patient  -WS      User Key  (r) = Recorded By, (t) = Taken By, (c) = Cosigned By    Initials Name Provider Type    ABISAI Chairez PTA Physical Therapy Assistant                Time Calculation:   Start Time: 0800  Stop Time: 0900  Time Calculation (min): 60 min    Therapy Charges for Today     Code Description Service Date Service Provider Modifiers Qty    28220451030 HC PT THER PROC EA 15 MIN 2/20/2017 José Miguel Chairez PTA GP 2    92250470137 HC PT MANUAL THERAPY EA 15 MIN 2/20/2017 José Miguel Chairez PTA GP 1    89499380371 HC PT HOT OR COLD PACK TREAT [facility] 2/20/2017 José Miguel Chairez PTA GP 1                    José Miguel Chairez PTA  2/20/2017

## 2017-02-22 ENCOUNTER — HOSPITAL ENCOUNTER (OUTPATIENT)
Dept: PHYSICAL THERAPY | Facility: HOSPITAL | Age: 52
Setting detail: THERAPIES SERIES
Discharge: HOME | End: 2017-02-22

## 2017-02-22 DIAGNOSIS — Z74.09 IMPAIRED MOBILITY: ICD-10-CM

## 2017-02-22 DIAGNOSIS — Z47.89 ORTHOPEDIC AFTERCARE: Primary | ICD-10-CM

## 2017-02-22 DIAGNOSIS — Z98.890 S/P ROTATOR CUFF REPAIR: ICD-10-CM

## 2017-02-22 PROCEDURE — 97110 THERAPEUTIC EXERCISES: CPT | Performed by: PHYSICAL THERAPIST

## 2017-02-22 PROCEDURE — 97140 MANUAL THERAPY 1/> REGIONS: CPT | Performed by: PHYSICAL THERAPIST

## 2017-02-22 NOTE — PROGRESS NOTES
Outpatient Physical Therapy Ortho Re-Assessment  [facility]     Patient Name: Korina Raygoza  : 1965  MRN: [id number]  Today's Date: 2017      Visit Date: 2017    Patient Active Problem List   Diagnosis   • Tear of medial meniscus of knee   • Osteoarthrosis, localized, primary, knee   • Knee pain, bilateral   • Tendonitis, Achilles, right   • Rotator cuff tendinitis   • Elbow laceration   • Complete tear of left rotator cuff        Past Medical History   Diagnosis Date   • Arthritis      KNEES   • Asthma    • History of herpes zoster    • Meniscus tear      LEFT - CURRENT   • Rosacea    • Rotator cuff tear         Past Surgical History   Procedure Laterality Date   • Mole removal  2013   • Knee surgery     • Shoulder arthroscopy w/ rotator cuff repair Left 10/18/2016     Procedure: SHOULDER ARTHROSCOPY WITH ROTATOR CUFF REPAIR AND LABRAL DEBRIDEMENT;  Surgeon: William Bowman MD;  [place] [facility] [place] [facility] [place]  Service:        Visit Dx:     ICD-10-CM ICD-9-CM   1. Orthopedic aftercare Z47.89 V54.9   2. S/P rotator cuff repair Z98.890 V45.89   3. Impaired mobility Z74.09 799.89                 PT Ortho       17 0800    Left Shoulder    Flexion AROM Deficit 160  -KT    Flexion PROM Deficit 165  -KT    ABduction AROM Deficit 155  -KT    ABduction PROM Deficit 155  -KT    External Rotation AROM Deficit 65, sitting  -KT    External Rotation PROM Deficit 75 supine  -KT    Internal Rotation AROM Deficit beltline  -KT    Internal Rotation PROM Deficit 75 supine  -KT    Left Shoulder    Flexion Gross Movement (4/5) good  -KT    ABduction Gross Movement (4-/5) good minus  -KT    Int Rotation Gross Movement (4/5) good  -KT    Ext Rotation Gross Movement (4/5) good  -KT      User Key  (r) = Recorded By, (t) = Taken By, (c) = Cosigned By    Initials Name Provider Type    CASTRO Nguyễn, PT Physical Therapist                            Therapy Education       17 0800    Therapy Education     Given HEP;Symptoms/condition management;Posture/body mechanics   added overhead reach with 2# weight on shelf, supine foam roller stretch  -KT    Program Reinforced  -KT    How Provided Verbal;Demonstration  -KT    Provided to Patient  -KT    Level of Understanding Teach back education performed;Verbalized;Demonstrated  -KT      02/20/17 0838    Therapy Education    Given HEP;Symptoms/condition management;Posture/body mechanics  -WS    Program Reinforced  -WS    How Provided Verbal  -WS    Provided to Patient  -WS      User Key  (r) = Recorded By, (t) = Taken By, (c) = Cosigned By    Initials Name Provider Type    ABISAI Chairez, PTA Physical Therapy Assistant    KT Katia Nguyễn, PT Physical Therapist                PT OP Goals       02/22/17 0800 02/20/17 0800 02/15/17 0800    PT Short Term Goals    STG Date to Achieve 11/29/16  -KT 11/29/16  -WS 11/29/16  -WS    STG 1 pt. to be I with initial HEP to facilitate self management of their condition  -KT pt. to be I with initial HEP to facilitate self management of their condition  -WS pt. to be I with initial HEP to facilitate self management of their condition  -WS    STG 1 Progress Met  -KT Met  -WS Met  -WS    STG 2 pt. to be educated in/verbalize understanding of the importance of posture/ergonomics in association with their condition to facilitate self management of their condition  -KT pt. to be educated in/verbalize understanding of the importance of posture/ergonomics in association with their condition to facilitate self management of their condition  -WS pt. to be educated in/verbalize understanding of the importance of posture/ergonomics in association with their condition to facilitate self management of their condition  -WS    STG 2 Progress Met  -KT Met  -WS Met  -WS    STG 3 pt. to demonstrate L shoulder flexion PROM >/= 0-120 to facilitate ease of performing self care activities  -KT pt. to demonstrate L shoulder flexion PROM >/= 0-120 to  facilitate ease of performing self care activities  -WS pt. to demonstrate L shoulder flexion PROM >/= 0-120 to facilitate ease of performing self care activities  -WS    STG 3 Progress Met  -KT Met  -WS Met  -WS    STG 4 pt. to demonstrate L shoulder PROM ER >/= 0-45 to facilitate ease of performing self care activities  -KT pt. to demonstrate L shoulder PROM ER >/= 0-45 to facilitate ease of performing self care activities  -WS pt. to demonstrate L shoulder PROM ER >/= 0-45 to facilitate ease of performing self care activities  -WS    STG 4 Progress Met  -KT Met  -WS Met  -WS    STG 5 pt. to report sleeping through the night without interruption secondary to her L shoulder pain to facilitate optimal healing   -KT pt. to report sleeping through the night without interruption secondary to her L shoulder pain to facilitate optimal healing   -WS pt. to report sleeping through the night without interruption secondary to her L shoulder pain to facilitate optimal healing   -WS    STG 5 Progress Partially Met  -KT Partially Met  -WS Partially Met  -WS    STG 5 Progress Comments still reports waking at night  -KT      Long Term Goals    LTG Date to Achieve 02/13/17  -KT 02/13/17  -WS 02/13/17  -WS    LTG 1 pt. to be I with advanced HEP to facilitate self management of their condition  -KT pt. to be I with advanced HEP to facilitate self management of their condition  -WS pt. to be I with advanced HEP to facilitate self management of their condition  -WS    LTG 1 Progress Ongoing  -KT Ongoing  -WS Ongoing  -WS    LTG 2 pt. to report a DASH </= 20% to demonstrate decreased level of perceived disability  -KT pt. to report a DASH </= 20% to demonstrate decreased level of perceived disability  -WS pt. to report a DASH </= 20% to demonstrate decreased level of perceived disability  -WS    LTG 2 Progress Progressing  -KT Ongoing  -WS Ongoing  -WS    LTG 2 Progress Comments DASH improved from 49 to 37%  -KT      LTG 3 pt. to  demonstrate L shoulder AROM FIR >/= mid line belt line to facilitate ease of performing self care/dressing activities  -KT pt. to demonstrate L shoulder AROM FIR >/= mid line belt line to facilitate ease of performing self care/dressing activities  -WS pt. to demonstrate L shoulder AROM FIR >/= mid line belt line to facilitate ease of performing self care/dressing activities  -WS    LTG 3 Progress Met  -KT Met  -WS Met  -WS    LTG 4 pt. to demonstrate L shoulder flexion AROM >/= 0-120 to facilitate ease of performing functional/household activities  -KT pt. to demonstrate L shoulder flexion AROM >/= 0-120 to facilitate ease of performing functional/household activities  -WS pt. to demonstrate L shoulder flexion AROM >/= 0-120 to facilitate ease of performing functional/household activities  -WS    LTG 4 Progress Partially Met  -KT Partially Met  -WS Partially Met  -WS    LTG 4 Progress Comments Improving though patient has some trouble with reaching overhead with dishes.  -KT      LTG 5 pt. to demonstrate placing/retrieving small object from an above the shoulder level shelf with her LUE to facilitate ease of performing household/work related activities  -KT pt. to demonstrate placing/retrieving small object from an above the shoulder level shelf with her LUE to facilitate ease of performing household/work related activities  -WS pt. to demonstrate placing/retrieving small object from an above the shoulder level shelf with her LUE to facilitate ease of performing household/work related activities  -WS    LTG 5 Progress Partially Met  -KT Ongoing  -WS Ongoing  -WS    LTG 5 Progress Comments started overhead reaching with weight today  -KT      LTG 6 pt. to report initiating personal fitness program at [facility] to faciliate optimal carry over and improved overall health  -KT pt. to report initiating personal fitness program at [facility] to faciliate optimal carry over and improved overall health  -WS pt. to report  initiating personal fitness program at [facility] to faciliate optimal carry over and improved overall health  -WS    LTG 6 Progress Met  -KT Met  -WS Met  -WS      User Key  (r) = Recorded By, (t) = Taken By, (c) = Cosigned By    Initials Name Provider Type    ABISAI Chairez, PTA Physical Therapy Assistant    KT Katia Nguyễn, PT Physical Therapist                PT Assessment/Plan       02/22/17 0800 02/20/17 0839       PT Assessment    Functional Limitations Performance in leisure activities;Performance in self-care ADL;Limitation in home management;Limitations in community activities;Limitations in functional capacity and performance  -KT      Impairments Range of motion;Posture;Muscle strength;Pain;Impaired muscle power;Impaired muscle endurance;Joint mobility  -KT      Assessment Comments Ms. Raygoza is a 50y/o R handed female. She is 18 weeks s/p L RC repair (sx 10/18/16). She has attended 18 sessions of physical therapy since her surgery. She demonstrates improving A/PROM (see specific joints). Strength is 4- to 4/5 for rotator cuff,  DASH score of 37% (previously was 49%), scored 0-100%, 0% represents no perceived disability. She continues to have difficulty with end ranges of motion and reaching against gravity with weighted objects.   She will benefit from skilled physical therapy intervention to address the above impairments.   -KT Continue to have sleep interuptions. Pain is well controlled. Continue to work on functional strengthening/stability of left scap/RTC  -WS     Please refer to paper survey for additional self-reported information Yes  -KT      Rehab Potential Excellent  -KT      Patient/caregiver participated in establishment of treatment plan and goals Yes  -KT      Patient would benefit from skilled therapy intervention Yes  -KT      PT Plan    PT Frequency 1x/week;2x/week  -KT      Predicted Duration of Therapy Intervention (days/wks) 3-4 weeks  -KT      PT Plan Comments Continue to  work on strengthening and ROM especially at end ranges of elevation.  -KT        User Key  (r) = Recorded By, (t) = Taken By, (c) = Cosigned By    Initials Name Provider Type    ABISAI Chairez PTA Physical Therapy Assistant    CASTRO Nguyễn, PT Physical Therapist                Modalities       02/22/17 0800          Subjective Comments    Subjective Comments Feeling good today.  -KT      Subjective Pain    Able to rate subjective pain? yes  -KT      Pre-Treatment Pain Level 0  -KT      Ice    Ice Applied Yes  -KT      Location L shoulder, pt. seated, LUE rested on pillows in lose pack  -KT      Rx Minutes 10 mins  -KT      Ice S/P Rx Yes  -KT        User Key  (r) = Recorded By, (t) = Taken By, (c) = Cosigned By    Initials Name Provider Type    CASTRO Nguyễn PT Physical Therapist              Exercises       02/22/17 0800          Subjective Comments    Subjective Comments Feeling good today.  -KT      Subjective Pain    Able to rate subjective pain? yes  -KT      Pre-Treatment Pain Level 0  -KT      Exercise 1    Exercise Name 1 shoulder shurgs  -KT      Cueing 1 Verbal  -KT      Equipment 1 Dumbell  -KT      Weights/Plates 1 3  -KT      Sets 1 2  -KT      Reps 1 20  -KT      Time (Seconds) 1 5  -KT      Exercise 2    Exercise Name 2 scap retraction, no shoulder ext  -KT      Cueing 2 Verbal;Tactile  -KT      Equipment 2 Theraband  -KT      Resistance 2 Green  -KT      Reps 2 20  -KT      Time (Seconds) 2 30  -KT      Exercise 3    Exercise Name 3 elbow flexion/ext ROM   -KT      Cueing 3 Verbal  -KT      Equipment 3 Dumbell  -KT      Weights/Plates 3 3  -KT      Reps 3 20  -KT      Time (Seconds) 3 30  -KT      Exercise 7    Exercise Name 7 wash the wall, LUE  -KT      Cueing 7 Verbal  -KT      Sets 7 1  -KT      Reps 7 20  -KT      Time (Seconds) 7 5  -KT      Exercise 8    Exercise Name 8 chest press, cane, supine  -KT      Cueing 8 Verbal  -KT      Equipment 8 Cuff Weight  -KT       Weights/Plates 8 5  -KT      Sets 8 2  -KT      Reps 8 10  -KT      Exercise 10    Exercise Name 10 FIR with towel  -KT      Cueing 10 Tactile  -KT      Sets 10 1  -KT      Reps 10 5  -KT      Time (Seconds) 10 10  -KT      Exercise 11    Exercise Name 11 prone shoulder ext  -KT      Cueing 11 Verbal  -KT      Equipment 11 Dumbell  -KT      Weights/Plates 11 2  -KT      Sets 11 2  -KT      Reps 11 10  -KT      Exercise 12    Exercise Name 12 supine clock, horizontal/diagonals   -KT      Cueing 12 Verbal  -KT      Equipment 12 Theraband  -KT      Weights/Plates 12 5  -KT      Resistance 12 Red  -KT      Sets 12 2  -KT      Reps 12 10  -KT      Exercise 13    Exercise Name 13 SL ER, towel roll   -KT      Cueing 13 Tactile  -KT      Equipment 13 Dumbell  -KT      Weights/Plates 13 2  -KT      Sets 13 2  -KT      Reps 13 10  -KT      Exercise 15    Exercise Name 15 walk the wall  -KT      Cueing 15 Demo  -KT      Resistance 15 Yellow  -KT      Sets 15 2  -KT      Reps 15 5  -KT      Time (Minutes) 15 5  -KT      Exercise 16    Exercise Name 16 doorway stretch  -KT      Cueing 16 Demo  -KT      Sets 16 3  -KT      Time (Seconds) 16 20  -KT      Exercise 17    Exercise Name 17 supine foam roller stretch palms up  -KT      Cueing 17 Tactile  -KT      Time (Minutes) 17 3  -KT      Exercise 18    Exercise Name 18 reach weight on overhead shelf  -KT      Cueing 18 Tactile  -KT      Equipment 18 Dumbell  -KT      Weights/Plates 18 2  -KT      Reps 18 10  -KT        User Key  (r) = Recorded By, (t) = Taken By, (c) = Cosigned By    Initials Name Provider Type    KT Katia Nguyễn, PT Physical Therapist           Manual Rx (last 36 hours)      Manual Treatments       02/22/17 0800          Manual Rx 1    Manual Rx 1 Location gentle inferior mobs L shoulder/oscillations for relaxation, pt. supine  -KT      Manual Rx 2    Manual Rx 2 Location PROM L shoulder, pt. supine, into flexion, abduction, ER, IR   -KT      Manual Rx 3     Manual Rx 3 Location slow reversal L ER/IR, 90 abd, POS, supine   -KT      Manual Rx 4    Manual Rx 4 Location slow reversal L shoulder D2, supine   -KT        User Key  (r) = Recorded By, (t) = Taken By, (c) = Cosigned By    Initials Name Provider Type    CASTRO Nguyễn PT Physical Therapist                            Outcome Measures       02/22/17 0800          Functional Assessment    Outcome Measure Options Disabilities of the Arm, Shoulder, and Hand (DASH)   37  -KT        User Key  (r) = Recorded By, (t) = Taken By, (c) = Cosigned By    Initials Name Provider Type    CASTRO Nguyễn PT Physical Therapist            Time Calculation:   Start Time: 0800  Stop Time: 0900  Time Calculation (min): 60 min  Total Timed Code Minutes- PT: 45 minute(s)     Therapy Charges for Today     Code Description Service Date Service Provider Modifiers Qty    19608002718 HC PT MANUAL THERAPY EA 15 MIN 2/22/2017 Katia Nguyễn, PT GP 1    26066463457 HC PT THER PROC EA 15 MIN 2/22/2017 Katia Nguyễn PT GP 2    [id number]  PT HOT OR COLD PACK TREAT [facility] 2/22/2017 Katia Nguyễn, PT GP 1          PT G-Codes  Outcome Measure Options: Disabilities of the Arm, Shoulder, and Hand (DASH) (37)         Katia Nguyễn PT  2/22/2017

## 2017-02-27 ENCOUNTER — APPOINTMENT (OUTPATIENT)
Dept: PHYSICAL THERAPY | Facility: HOSPITAL | Age: 52
End: 2017-02-27

## 2017-03-01 ENCOUNTER — HOSPITAL ENCOUNTER (OUTPATIENT)
Dept: PHYSICAL THERAPY | Facility: HOSPITAL | Age: 52
Setting detail: THERAPIES SERIES
Discharge: HOME | End: 2017-03-01

## 2017-03-01 DIAGNOSIS — M17.0 PRIMARY OSTEOARTHRITIS OF BOTH KNEES: ICD-10-CM

## 2017-03-01 DIAGNOSIS — M25.661 LIMITATION OF JOINT MOTION OF KNEE, RIGHT: ICD-10-CM

## 2017-03-01 DIAGNOSIS — M25.662 KNEE STIFFNESS, LEFT: ICD-10-CM

## 2017-03-01 DIAGNOSIS — Z74.09 IMPAIRED MOBILITY: ICD-10-CM

## 2017-03-01 DIAGNOSIS — Z47.89 ORTHOPEDIC AFTERCARE: Primary | ICD-10-CM

## 2017-03-01 DIAGNOSIS — Z98.890 S/P ROTATOR CUFF REPAIR: ICD-10-CM

## 2017-03-01 PROCEDURE — 97110 THERAPEUTIC EXERCISES: CPT

## 2017-03-01 PROCEDURE — 97140 MANUAL THERAPY 1/> REGIONS: CPT

## 2017-03-01 NOTE — PROGRESS NOTES
Outpatient Physical Therapy Ortho Treatment Note  [facility]     Patient Name: Korina Raygoza  : 1965  MRN: [id number]  Today's Date: 3/1/2017      Visit Date: 2017    Visit Dx:    ICD-10-CM ICD-9-CM   1. Orthopedic aftercare Z47.89 V54.9   2. S/P rotator cuff repair Z98.890 V45.89   3. Impaired mobility Z74.09 799.89   4. Knee stiffness, left M25.662 719.56   5. Limitation of joint motion of knee, right M25.661 719.56   6. Primary osteoarthritis of both knees M17.0 715.16       Patient Active Problem List   Diagnosis   • Tear of medial meniscus of knee   • Osteoarthrosis, localized, primary, knee   • Knee pain, bilateral   • Tendonitis, Achilles, right   • Rotator cuff tendinitis   • Elbow laceration   • Complete tear of left rotator cuff        Past Medical History   Diagnosis Date   • Arthritis      KNEES   • Asthma    • History of herpes zoster    • Meniscus tear      LEFT - CURRENT   • Rosacea    • Rotator cuff tear         Past Surgical History   Procedure Laterality Date   • Mole removal  2013   • Knee surgery     • Shoulder arthroscopy w/ rotator cuff repair Left 10/18/2016     Procedure: SHOULDER ARTHROSCOPY WITH ROTATOR CUFF REPAIR AND LABRAL DEBRIDEMENT;  Surgeon: William Bowman MD;  Location: [facility] [place];  Service:                              PT Assessment/Plan       17 0844       PT Assessment    Assessment Comments WEakness noted with lifting weight shelf to shlef. RTC continue to need improve strengthening. Increased S/L ER to 3#.   -WS       User Key  (r) = Recorded By, (t) = Taken By, (c) = Cosigned By    Initials Name Provider Type    ABISAI Chairez PTA Physical Therapy Assistant                Modalities       17 0800          Ice    Ice Applied Yes  -WS      Location L shoulder, pt. seated, LUE rested on pillows in lose pack  -WS      Rx Minutes 10 mins  -WS      Ice S/P Rx Yes  -WS        User Key  (r) = Recorded By, (t) = Taken By, (c) = Cosigned  By    Initials Name Provider Type    WS José Miguel Chairez PTA Physical Therapy Assistant                Exercises       03/01/17 0800          Subjective Comments    Subjective Comments No pain in the shoulder  -WS      Subjective Pain    Able to rate subjective pain? yes  -WS      Pre-Treatment Pain Level 0  -WS      Exercise 1    Exercise Name 1 shoulder shurgs  -WS      Cueing 1 Verbal  -WS      Equipment 1 Dumbell  -WS      Weights/Plates 1 4  -WS      Reps 1 20  -WS      Exercise 2    Exercise Name 2 scap retraction, no shoulder ext  -WS      Cueing 2 Verbal;Tactile  -WS      Equipment 2 Theraband  -WS      Resistance 2 Blue  -WS      Reps 2 20  -WS      Exercise 3    Exercise Name 3 elbow flexion/ext ROM   -WS      Cueing 3 Verbal  -WS      Equipment 3 Dumbell  -WS      Weights/Plates 3 3  -WS      Reps 3 20  -WS      Exercise 4    Exercise Name 4 seated pull down  -WS      Cueing 4 Verbal  -WS      Equipment 4 --   belt  -WS      Resistance 4 Blue  -WS      Reps 4 20  -WS      Exercise 6    Exercise Name 6 AAROM abduction with cane  -WS      Cueing 6 Verbal  -WS      Reps 6 20  -WS      Time (Minutes) 6 2  -WS      Time (Seconds) 6 20  -WS      Exercise 7    Exercise Name 7 wash the wall, LUE  -WS      Cueing 7 Verbal  -WS      Sets 7 1  -WS      Reps 7 20  -WS      Exercise 8    Exercise Name 8 chest press, cane, supine  -WS      Cueing 8 Verbal  -WS      Equipment 8 Cuff Weight  -WS      Weights/Plates 8 5  -WS      Sets 8 2  -WS      Reps 8 10  -WS      Exercise 10    Exercise Name 10 FIR with towel  -WS      Cueing 10 Tactile  -WS      Reps 10 5  -WS      Time (Seconds) 10 10  -WS      Exercise 11    Exercise Name 11 prone shoulder ext  -WS      Cueing 11 Verbal  -WS      Equipment 11 Dumbell  -WS      Weights/Plates 11 2  -WS      Sets 11 2  -WS      Reps 11 10  -WS      Exercise 12    Exercise Name 12 supine clock, horizontal/diagonals   -WS      Cueing 12 Verbal  -WS      Equipment 12 Theraband  -WS       Resistance 12 Red  -WS      Sets 12 2  -WS      Reps 12 10  -WS      Exercise 13    Exercise Name 13 SL ER, towel roll   -WS      Cueing 13 Tactile  -WS      Equipment 13 Dumbell  -WS      Weights/Plates 13 2  -WS      Sets 13 2  -WS      Reps 13 10  -WS      Exercise 14    Exercise Name 14 prone row to neutral  -WS      Cueing 14 Tactile  -WS      Equipment 14 Dumbell  -WS      Weights/Plates 14 3  -WS      Sets 14 2  -WS      Reps 14 10  -WS      Exercise 15    Exercise Name 15 walk the wall  -WS      Cueing 15 Demo  -WS      Resistance 15 Yellow  -WS      Sets 15 2  -WS      Reps 15 5  -WS      Exercise 16    Exercise Name 16 doorway stretch  -WS      Cueing 16 Demo  -WS      Sets 16 3  -WS      Time (Seconds) 16 20  -WS      Exercise 18    Exercise Name 18 reach weight on overhead shelf  -WS      Cueing 18 Tactile  -WS      Equipment 18 Dumbell  -WS      Weights/Plates 18 2  -WS      Reps 18 10  -WS        User Key  (r) = Recorded By, (t) = Taken By, (c) = Cosigned By    Initials Name Provider Type     José Miguel Chairez PTA Physical Therapy Assistant                        Manual Rx (last 36 hours)      Manual Treatments       03/01/17 0838          Manual Rx 1    Manual Rx 1 Location gentle inferior mobs L shoulder/oscillations for relaxation, pt. supine  -WS      Manual Rx 2    Manual Rx 2 Location PROM L shoulder, pt. supine, into flexion, abduction, ER, IR   -WS      Manual Rx 3    Manual Rx 3 Location slow reversal L ER/IR, 90 abd, POS, supine   -WS      Manual Rx 4    Manual Rx 4 Location slow reversal L shoulder D2, supine   -WS      Manual Rx 5    Manual Rx 5 Location shoulder abd resistive to 90 while supine  -WS        User Key  (r) = Recorded By, (t) = Taken By, (c) = Cosigned By    Initials Name Provider Type     José Miguel Chairez PTA Physical Therapy Assistant                PT OP Goals       03/01/17 0800       PT Short Term Goals    STG Date to Achieve 11/29/16  -WS     STG 1 pt. to be I  with initial HEP to facilitate self management of their condition  -WS     STG 1 Progress Met  -WS     STG 2 pt. to be educated in/verbalize understanding of the importance of posture/ergonomics in association with their condition to facilitate self management of their condition  -WS     STG 2 Progress Met  -WS     STG 3 pt. to demonstrate L shoulder flexion PROM >/= 0-120 to facilitate ease of performing self care activities  -WS     STG 3 Progress Met  -WS     STG 4 pt. to demonstrate L shoulder PROM ER >/= 0-45 to facilitate ease of performing self care activities  -WS     STG 4 Progress Met  -WS     STG 5 pt. to report sleeping through the night without interruption secondary to her L shoulder pain to facilitate optimal healing   -WS     STG 5 Progress Partially Met  -WS     Long Term Goals    LTG Date to Achieve 02/13/17  -WS     LTG 1 pt. to be I with advanced HEP to facilitate self management of their condition  -WS     LTG 1 Progress Ongoing  -WS     LTG 2 pt. to report a DASH </= 20% to demonstrate decreased level of perceived disability  -WS     LTG 2 Progress Progressing  -WS     LTG 3 pt. to demonstrate L shoulder AROM FIR >/= mid line belt line to facilitate ease of performing self care/dressing activities  -WS     LTG 3 Progress Met  -WS     LTG 4 pt. to demonstrate L shoulder flexion AROM >/= 0-120 to facilitate ease of performing functional/household activities  -WS     LTG 4 Progress Partially Met  -WS     LTG 5 pt. to demonstrate placing/retrieving small object from an above the shoulder level shelf with her LUE to facilitate ease of performing household/work related activities  -WS     LTG 5 Progress Partially Met  -WS     LTG 6 pt. to report initiating personal fitness program at [facility] to faciliate optimal carry over and improved overall health  -WS     LTG 6 Progress Met  -WS       User Key  (r) = Recorded By, (t) = Taken By, (c) = Cosigned By    Initials Name Provider Type    WS José Miguel  SKYLAR Chairez Physical Therapy Assistant                Therapy Education       03/01/17 0844          Therapy Education    Given HEP;Symptoms/condition management;Pain management;Posture/body mechanics  -WS      Program Reinforced  -WS      How Provided Verbal  -WS      Provided to Patient  -WS        User Key  (r) = Recorded By, (t) = Taken By, (c) = Cosigned By    Initials Name Provider Type    ABISAI Chairez PTA Physical Therapy Assistant                Time Calculation:   Start Time: 0800  Stop Time: 0900  Time Calculation (min): 60 min    Therapy Charges for Today     Code Description Service Date Service Provider Modifiers Qty    84262675842 HC PT THER PROC EA 15 MIN 3/1/2017 José Miguel Chairez PTA GP 2    47320105418 HC PT MANUAL THERAPY EA 15 MIN 3/1/2017 José Miguel Chairez PTA GP 1    24244464330 HC PT HOT OR COLD PACK TREAT [facility] 3/1/2017 José Miguel Chairez PTA GP 1                    José Miguel Chairez PTA  3/1/2017

## 2017-03-08 ENCOUNTER — HOSPITAL ENCOUNTER (OUTPATIENT)
Dept: PHYSICAL THERAPY | Facility: HOSPITAL | Age: 52
Setting detail: THERAPIES SERIES
Discharge: HOME | End: 2017-03-08

## 2017-03-08 DIAGNOSIS — Z98.890 S/P ROTATOR CUFF REPAIR: ICD-10-CM

## 2017-03-08 DIAGNOSIS — Z47.89 ORTHOPEDIC AFTERCARE: Primary | ICD-10-CM

## 2017-03-08 DIAGNOSIS — Z74.09 IMPAIRED MOBILITY: ICD-10-CM

## 2017-03-08 DIAGNOSIS — M25.661 LIMITATION OF JOINT MOTION OF KNEE, RIGHT: ICD-10-CM

## 2017-03-08 DIAGNOSIS — M25.662 KNEE STIFFNESS, LEFT: ICD-10-CM

## 2017-03-08 DIAGNOSIS — M17.0 PRIMARY OSTEOARTHRITIS OF BOTH KNEES: ICD-10-CM

## 2017-03-08 PROCEDURE — 97140 MANUAL THERAPY 1/> REGIONS: CPT

## 2017-03-08 PROCEDURE — 97110 THERAPEUTIC EXERCISES: CPT

## 2017-03-08 NOTE — PROGRESS NOTES
Outpatient Physical Therapy Ortho Treatment Note  [facility]     Patient Name: Korina Raygoza  : 1965  MRN: [id number]  Today's Date: 3/8/2017      Visit Date: 2017    Visit Dx:    ICD-10-CM ICD-9-CM   1. Orthopedic aftercare Z47.89 V54.9   2. S/P rotator cuff repair Z98.890 V45.89   3. Impaired mobility Z74.09 799.89   4. Knee stiffness, left M25.662 719.56   5. Limitation of joint motion of knee, right M25.661 719.56   6. Primary osteoarthritis of both knees M17.0 715.16       Patient Active Problem List   Diagnosis   • Tear of medial meniscus of knee   • Osteoarthrosis, localized, primary, knee   • Knee pain, bilateral   • Tendonitis, Achilles, right   • Rotator cuff tendinitis   • Elbow laceration   • Complete tear of left rotator cuff        Past Medical History   Diagnosis Date   • Arthritis      KNEES   • Asthma    • History of herpes zoster    • Meniscus tear      LEFT - CURRENT   • Rosacea    • Rotator cuff tear         Past Surgical History   Procedure Laterality Date   • Mole removal  2013   • Knee surgery     • Shoulder arthroscopy w/ rotator cuff repair Left 10/18/2016     Procedure: SHOULDER ARTHROSCOPY WITH ROTATOR CUFF REPAIR AND LABRAL DEBRIDEMENT;  Surgeon: William Bowman MD;  Location: [facility] OR [facility] [place];  Service:                              PT Assessment/Plan       17 1013       PT Assessment    Assessment Comments Continue to work on functional strengthening. Becomming easier to place weight on shelf  -WS       User Key  (r) = Recorded By, (t) = Taken By, (c) = Cosigned By    Initials Name Provider Type    ABISAI Chairez PTA Physical Therapy Assistant                Modalities       17 0930          Ice    Ice Applied Yes  -WS      Location L shoulder, pt. seated, LUE rested on pillows in lose pack  -WS      Rx Minutes 10 mins  -WS      Ice S/P Rx Yes  -WS        User Key  (r) = Recorded By, (t) = Taken By, (c) = Cosigned By    Initials Name Provider  Type    WS José Miguel Chairez PTA Physical Therapy Assistant                Exercises       03/08/17 0930          Subjective Comments    Subjective Comments Shoulder doing very well  -WS      Subjective Pain    Able to rate subjective pain? yes  -WS      Pre-Treatment Pain Level 0  -WS      Exercise 1    Exercise Name 1 shoulder shurgs  -WS      Cueing 1 Verbal  -WS      Equipment 1 Dumbell  -WS      Weights/Plates 1 4  -WS      Reps 1 20  -WS      Exercise 2    Exercise Name 2 scap retraction, no shoulder ext  -WS      Cueing 2 Verbal;Tactile  -WS      Equipment 2 Theraband  -WS      Resistance 2 Blue  -WS      Reps 2 20  -WS      Exercise 3    Exercise Name 3 elbow flexion/ext ROM   -WS      Cueing 3 Verbal  -WS      Equipment 3 Dumbell  -WS      Weights/Plates 3 3  -WS      Reps 3 20  -WS      Exercise 4    Exercise Name 4 seated pull down  -WS      Cueing 4 Verbal  -WS      Resistance 4 Blue  -WS      Reps 4 20  -WS      Exercise 6    Exercise Name 6 --  -WS      Cueing 6 --  -WS      Reps 6 --  -WS      Exercise 8    Exercise Name 8 chest press, cane, supine  -WS      Cueing 8 Verbal  -WS      Equipment 8 Cuff Weight  -WS      Weights/Plates 8 5  -WS      Sets 8 2  -WS      Reps 8 10  -WS      Exercise 10    Exercise Name 10 FIR with towel  -WS      Cueing 10 Tactile  -WS      Reps 10 5  -WS      Time (Seconds) 10 10  -WS      Exercise 11    Exercise Name 11 prone shoulder ext  -WS      Cueing 11 Verbal  -WS      Equipment 11 Dumbell  -WS      Weights/Plates 11 2  -WS      Sets 11 2  -WS      Reps 11 10  -WS      Exercise 12    Exercise Name 12 supine clock, horizontal/diagonals   -WS      Cueing 12 Verbal  -WS      Equipment 12 Theraband  -WS      Resistance 12 Red  -WS      Sets 12 2  -WS      Reps 12 10  -WS      Exercise 13    Exercise Name 13 SL ER, towel roll   -WS      Cueing 13 Tactile  -WS      Equipment 13 Dumbell  -WS      Weights/Plates 13 3  -WS      Sets 13 2  -WS      Reps 13 10  -WS       Exercise 14    Exercise Name 14 prone row to neutral  -WS      Cueing 14 Tactile  -WS      Equipment 14 Dumbell  -WS      Weights/Plates 14 3  -WS      Sets 14 2  -WS      Reps 14 10  -WS      Time (Seconds) 14 10  -WS      Exercise 15    Exercise Name 15 walk the wall  -WS      Cueing 15 Demo  -WS      Resistance 15 Yellow  -WS      Sets 15 2  -WS      Reps 15 5  -WS      Exercise 16    Exercise Name 16 doorway stretch  -WS      Cueing 16 Demo  -WS      Sets 16 3  -WS      Time (Seconds) 16 20  -WS      Exercise 18    Exercise Name 18 reach weight on overhead shelf  -WS      Cueing 18 Tactile  -WS      Equipment 18 Dumbell  -WS      Weights/Plates 18 2  -WS      Reps 18 10  -WS      Exercise 19    Exercise Name 19 rainbow on wall  -WS      Reps 19 10  -WS        User Key  (r) = Recorded By, (t) = Taken By, (c) = Cosigned By    Initials Name Provider Type    WS José Miguel Chairez PTA Physical Therapy Assistant                        Manual Rx (last 36 hours)      Manual Treatments       03/08/17 0900          Manual Rx 1    Manual Rx 1 Location gentle inferior mobs L shoulder/oscillations for relaxation, pt. supine  -WS      Manual Rx 2    Manual Rx 2 Location PROM L shoulder, pt. supine, into flexion, abduction, ER, IR   -WS      Manual Rx 3    Manual Rx 3 Location slow reversal L ER/IR, 90 abd, POS, supine   -WS      Manual Rx 4    Manual Rx 4 Location slow reversal L shoulder D2, supine   -WS      Manual Rx 5    Manual Rx 5 Location shoulder abd resistive to 90 while supine  -WS        User Key  (r) = Recorded By, (t) = Taken By, (c) = Cosigned By    Initials Name Provider Type     José Miguel Chairez PTA Physical Therapy Assistant                PT OP Goals       03/08/17 1000       PT Short Term Goals    STG Date to Achieve 11/29/16  -WS     STG 1 pt. to be I with initial HEP to facilitate self management of their condition  -WS     STG 1 Progress Met  -WS     STG 2 pt. to be educated in/verbalize understanding  of the importance of posture/ergonomics in association with their condition to facilitate self management of their condition  -WS     STG 2 Progress Met  -WS     STG 3 pt. to demonstrate L shoulder flexion PROM >/= 0-120 to facilitate ease of performing self care activities  -WS     STG 3 Progress Met  -WS     STG 4 pt. to demonstrate L shoulder PROM ER >/= 0-45 to facilitate ease of performing self care activities  -WS     STG 4 Progress Met  -WS     STG 5 pt. to report sleeping through the night without interruption secondary to her L shoulder pain to facilitate optimal healing   -WS     STG 5 Progress Partially Met  -WS     Long Term Goals    LTG Date to Achieve 02/13/17  -WS     LTG 1 pt. to be I with advanced HEP to facilitate self management of their condition  -WS     LTG 1 Progress Ongoing  -WS     LTG 2 pt. to report a DASH </= 20% to demonstrate decreased level of perceived disability  -WS     LTG 2 Progress Progressing  -WS     LTG 3 pt. to demonstrate L shoulder AROM FIR >/= mid line belt line to facilitate ease of performing self care/dressing activities  -WS     LTG 3 Progress Met  -WS     LTG 4 pt. to demonstrate L shoulder flexion AROM >/= 0-120 to facilitate ease of performing functional/household activities  -WS     LTG 4 Progress Partially Met  -WS     LTG 5 pt. to demonstrate placing/retrieving small object from an above the shoulder level shelf with her LUE to facilitate ease of performing household/work related activities  -WS     LTG 5 Progress Partially Met  -WS     LTG 6 pt. to report initiating personal fitness program at [facility] to faciliate optimal carry over and improved overall health  -WS     LTG 6 Progress Met  -       User Key  (r) = Recorded By, (t) = Taken By, (c) = Cosigned By    Initials Name Provider Type    ABISAI Chairez PTA Physical Therapy Assistant                    Time Calculation:   Start Time: 0930  Stop Time: 1030  Time Calculation (min): 60 min    Therapy  Charges for Today     Code Description Service Date Service Provider Modifiers Qty    75475028773 HC PT THER PROC EA 15 MIN 3/8/2017 José Miguel Chairez PTA GP 2    87568407421 HC PT MANUAL THERAPY EA 15 MIN 3/8/2017 José Miguel Chairez PTA GP 1    61191513706 HC PT HOT OR COLD PACK TREAT [facility] 3/8/2017 José Miguel Chairez PTA GP 1                    José Miguel Chairez PTA  3/8/2017

## 2017-03-13 ENCOUNTER — APPOINTMENT (OUTPATIENT)
Dept: PHYSICAL THERAPY | Facility: HOSPITAL | Age: 52
End: 2017-03-13

## 2017-03-20 ENCOUNTER — HOSPITAL ENCOUNTER (OUTPATIENT)
Dept: PHYSICAL THERAPY | Facility: HOSPITAL | Age: 52
Setting detail: THERAPIES SERIES
Discharge: HOME | End: 2017-03-20

## 2017-03-20 DIAGNOSIS — M25.661 LIMITATION OF JOINT MOTION OF KNEE, RIGHT: ICD-10-CM

## 2017-03-20 DIAGNOSIS — M25.662 KNEE STIFFNESS, LEFT: ICD-10-CM

## 2017-03-20 DIAGNOSIS — Z98.890 S/P ROTATOR CUFF REPAIR: ICD-10-CM

## 2017-03-20 DIAGNOSIS — M17.0 PRIMARY OSTEOARTHRITIS OF BOTH KNEES: ICD-10-CM

## 2017-03-20 DIAGNOSIS — Z74.09 IMPAIRED MOBILITY: ICD-10-CM

## 2017-03-20 DIAGNOSIS — Z47.89 ORTHOPEDIC AFTERCARE: Primary | ICD-10-CM

## 2017-03-20 PROCEDURE — 97110 THERAPEUTIC EXERCISES: CPT

## 2017-03-20 PROCEDURE — 97140 MANUAL THERAPY 1/> REGIONS: CPT

## 2017-03-20 NOTE — PROGRESS NOTES
Outpatient Physical Therapy Ortho Treatment Note  [facility]     Patient Name: Korina Raygoza  : 1965  MRN: [id number]  Today's Date: 3/20/2017      Visit Date: 2017    Visit Dx:    ICD-10-CM ICD-9-CM   1. Orthopedic aftercare Z47.89 V54.9   2. S/P rotator cuff repair Z98.890 V45.89   3. Impaired mobility Z74.09 799.89   4. Knee stiffness, left M25.662 719.56   5. Limitation of joint motion of knee, right M25.661 719.56   6. Primary osteoarthritis of both knees M17.0 715.16       Patient Active Problem List   Diagnosis   • Tear of medial meniscus of knee   • Osteoarthrosis, localized, primary, knee   • Knee pain, bilateral   • Tendonitis, Achilles, right   • Rotator cuff tendinitis   • Elbow laceration   • Complete tear of left rotator cuff        Past Medical History   Diagnosis Date   • Arthritis      KNEES   • Asthma    • History of herpes zoster    • Meniscus tear      LEFT - CURRENT   • Rosacea    • Rotator cuff tear         Past Surgical History   Procedure Laterality Date   • Mole removal  2013   • Knee surgery     • Shoulder arthroscopy w/ rotator cuff repair Left 10/18/2016     Procedure: SHOULDER ARTHROSCOPY WITH ROTATOR CUFF REPAIR AND LABRAL DEBRIDEMENT;  Surgeon: William Bowman MD;  Location: [facility] OR [facility] [place];  Service:                              PT Assessment/Plan       17 0845       PT Assessment    Assessment Comments Patient is independent with home management and discharged to continue work on rom/strength at home. Patient is discharged at this time.   -WS       User Key  (r) = Recorded By, (t) = Taken By, (c) = Cosigned By    Initials Name Provider Type    ABISAI Chairez PTA Physical Therapy Assistant                Modalities       17 0800          Ice    Ice Applied Yes  -WS      Location L shoulder, pt. seated, LUE rested on pillows in lose pack  -WS      Rx Minutes 10 mins  -WS      Ice S/P Rx Yes  -WS        User Key  (r) = Recorded By, (t) = Taken  By, (c) = Cosigned By    Initials Name Provider Type    WS José Miguel Chairez PTA Physical Therapy Assistant                Exercises       03/20/17 0800          Subjective Comments    Subjective Comments Shoulder is a little sore  -WS      Subjective Pain    Able to rate subjective pain? yes  -WS      Pre-Treatment Pain Level 1  -WS      Exercise 1    Exercise Name 1 shoulder shurgs  -WS      Cueing 1 Verbal  -WS      Equipment 1 Dumbell  -WS      Weights/Plates 1 4  -WS      Reps 1 20  -WS      Exercise 2    Exercise Name 2 scap retraction, no shoulder ext  -WS      Cueing 2 Verbal;Tactile  -WS      Equipment 2 Theraband  -WS      Resistance 2 Blue  -WS      Reps 2 20  -WS      Exercise 3    Exercise Name 3 elbow flexion/ext ROM   -WS      Cueing 3 Verbal  -WS      Equipment 3 Dumbell  -WS      Weights/Plates 3 3  -WS      Reps 3 20  -WS      Time (Seconds) 3 30  -WS      Exercise 4    Exercise Name 4 seated pull down  -WS      Cueing 4 Verbal  -WS      Equipment 4 --   belt  -WS      Resistance 4 Blue  -WS      Reps 4 20  -WS      Time (Seconds) 4 30  -WS      Exercise 7    Exercise Name 7 wash the wall, LUE  -WS      Cueing 7 Verbal  -WS      Sets 7 1  -WS      Reps 7 20  -WS      Time (Seconds) 7 5  -WS      Exercise 8    Exercise Name 8 chest press, cane, supine  -WS      Cueing 8 Verbal  -WS      Equipment 8 Cuff Weight  -WS      Weights/Plates 8 5  -WS      Sets 8 2  -WS      Reps 8 10  -WS      Exercise 10    Exercise Name 10 FIR with towel  -WS      Cueing 10 Tactile  -WS      Sets 10 1  -WS      Reps 10 5  -WS      Time (Seconds) 10 10  -WS      Exercise 11    Exercise Name 11 prone shoulder ext  -WS      Cueing 11 Verbal  -WS      Equipment 11 Dumbell  -WS      Weights/Plates 11 2  -WS      Sets 11 2  -WS      Reps 11 10  -WS      Exercise 12    Exercise Name 12 supine clock, horizontal/diagonals   -WS      Cueing 12 Verbal  -WS      Equipment 12 Theraband  -WS      Weights/Plates 12 --  -WS       Resistance 12 Red  -WS      Sets 12 2  -WS      Reps 12 10  -WS      Exercise 13    Exercise Name 13 SL ER, towel roll   -WS      Cueing 13 Tactile  -WS      Equipment 13 Dumbell  -WS      Weights/Plates 13 3  -WS      Sets 13 2  -WS      Reps 13 10  -WS      Exercise 14    Exercise Name 14 prone row to neutral  -WS      Cueing 14 Tactile  -WS      Equipment 14 Dumbell  -WS      Weights/Plates 14 3  -WS      Sets 14 2  -WS      Reps 14 10  -WS      Exercise 15    Exercise Name 15 walk the wall  -WS      Cueing 15 Demo  -WS      Resistance 15 Yellow  -WS      Sets 15 2  -WS      Reps 15 5  -WS      Exercise 16    Exercise Name 16 doorway stretch  -WS      Cueing 16 Demo  -WS      Sets 16 3  -WS      Time (Seconds) 16 20  -WS      Exercise 17    Time (Minutes) 17 3  -WS      Exercise 18    Exercise Name 18 reach weight on overhead shelf  -WS      Cueing 18 Tactile  -WS      Equipment 18 Dumbell  -WS      Weights/Plates 18 2  -WS      Reps 18 10  -WS      Exercise 19    Exercise Name 19 rainbow on wall  -WS      Reps 19 10  -WS        User Key  (r) = Recorded By, (t) = Taken By, (c) = Cosigned By    Initials Name Provider Type    ABISAI Chairez, PTA Physical Therapy Assistant                               PT OP Goals       03/20/17 0800       PT Short Term Goals    STG Date to Achieve 11/29/16  -     STG 1 pt. to be I with initial HEP to facilitate self management of their condition  -WS     STG 1 Progress Met  -     STG 2 pt. to be educated in/verbalize understanding of the importance of posture/ergonomics in association with their condition to facilitate self management of their condition  -WS     STG 2 Progress Met  -WS     STG 3 pt. to demonstrate L shoulder flexion PROM >/= 0-120 to facilitate ease of performing self care activities  -WS     STG 3 Progress Met  -WS     STG 4 pt. to demonstrate L shoulder PROM ER >/= 0-45 to facilitate ease of performing self care activities  -     STG 4 Progress Met   -WS     STG 5 pt. to report sleeping through the night without interruption secondary to her L shoulder pain to facilitate optimal healing   -WS     STG 5 Progress Partially Met  -WS     Long Term Goals    LTG Date to Achieve 02/13/17  -WS     LTG 1 pt. to be I with advanced HEP to facilitate self management of their condition  -WS     LTG 1 Progress Met  -WS     LTG 2 pt. to report a DASH </= 20% to demonstrate decreased level of perceived disability  -WS     LTG 3 pt. to demonstrate L shoulder AROM FIR >/= mid line belt line to facilitate ease of performing self care/dressing activities  -WS     LTG 3 Progress Met  -WS     LTG 4 pt. to demonstrate L shoulder flexion AROM >/= 0-120 to facilitate ease of performing functional/household activities  -WS     LTG 4 Progress Partially Met  -WS     LTG 5 pt. to demonstrate placing/retrieving small object from an above the shoulder level shelf with her LUE to facilitate ease of performing household/work related activities  -WS     LTG 5 Progress Partially Met  -WS     LTG 6 pt. to report initiating personal fitness program at [facility] to faciliate optimal carry over and improved overall health  -WS     LTG 6 Progress Met  -WS       User Key  (r) = Recorded By, (t) = Taken By, (c) = Cosigned By    Initials Name Provider Type    ABISAI Chairez PTA Physical Therapy Assistant                Therapy Education       03/20/17 0843          Therapy Education    Given HEP;Symptoms/condition management;Pain management;Posture/body mechanics  -WS      Program Reinforced  -WS      How Provided Verbal  -WS      Provided to Patient  -WS        User Key  (r) = Recorded By, (t) = Taken By, (c) = Cosigned By    Initials Name Provider Type    ABISAI Chairez PTA Physical Therapy Assistant                Time Calculation:   Start Time: 0800  Stop Time: 0855  Time Calculation (min): 55 min    Therapy Charges for Today     Code Description Service Date Service Provider Modifiers  Qty    77827897689 HC PT THER PROC EA 15 MIN 3/20/2017 José Miguel Chairez, SKYLAR GP 2    30241756989 HC PT MANUAL THERAPY EA 15 MIN 3/20/2017 José Miguel Chairez, SKYLAR GP 1    18613395809 HC PT HOT OR COLD PACK TREAT [facility] 3/20/2017 José Miguel Chairez PTA GP 1                    José Miguel Chairez, SKYLAR  3/20/2017

## 2017-04-14 ENCOUNTER — OFFICE VISIT (OUTPATIENT)
Dept: ORTHOPEDIC SURGERY | Facility: CLINIC | Age: 52
End: 2017-04-14

## 2017-04-14 VITALS — BODY MASS INDEX: 30.29 KG/M2 | TEMPERATURE: 99.1 F | HEIGHT: 67 IN | WEIGHT: 193 LBS

## 2017-04-14 DIAGNOSIS — Z09 SURGERY FOLLOW-UP: Primary | ICD-10-CM

## 2017-04-14 PROCEDURE — 20610 DRAIN/INJ JOINT/BURSA W/O US: CPT | Performed by: ORTHOPAEDIC SURGERY

## 2017-04-14 PROCEDURE — 99212 OFFICE O/P EST SF 10 MIN: CPT | Performed by: ORTHOPAEDIC SURGERY

## 2017-04-14 RX ADMIN — METHYLPREDNISOLONE ACETATE 160 MG: 80 INJECTION, SUSPENSION INTRA-ARTICULAR; INTRALESIONAL; INTRAMUSCULAR; SOFT TISSUE at 15:14

## 2017-04-14 RX ADMIN — LIDOCAINE HYDROCHLORIDE 2 ML: 10 INJECTION, SOLUTION INFILTRATION; PERINEURAL at 15:14

## 2017-04-14 RX ADMIN — BUPIVACAINE HYDROCHLORIDE 2 ML: 5 INJECTION, SOLUTION PERINEURAL at 15:14

## 2017-04-16 RX ORDER — BUPIVACAINE HYDROCHLORIDE 5 MG/ML
2 INJECTION, SOLUTION PERINEURAL
Status: COMPLETED | OUTPATIENT
Start: 2017-04-14 | End: 2017-04-14

## 2017-04-16 RX ORDER — LIDOCAINE HYDROCHLORIDE 10 MG/ML
2 INJECTION, SOLUTION INFILTRATION; PERINEURAL
Status: COMPLETED | OUTPATIENT
Start: 2017-04-14 | End: 2017-04-14

## 2017-04-16 RX ORDER — METHYLPREDNISOLONE ACETATE 80 MG/ML
160 INJECTION, SUSPENSION INTRA-ARTICULAR; INTRALESIONAL; INTRAMUSCULAR; SOFT TISSUE
Status: COMPLETED | OUTPATIENT
Start: 2017-04-14 | End: 2017-04-14

## 2017-04-16 NOTE — PROGRESS NOTES
"Korina comes in today for follow-up of the left shoulder.  She continues to have some intermittent soreness and weakness.  Denies any new problems or issues.  She tells me that she does feel like she is better than when I last saw her but certainly not back to normal.  She admits that she has been very apprehensive about using the shoulder.    She also mentions a new complaint of left knee pain.  She has previously seen Dr. Buckley for this.  She tells me that it is \"bone-on-bone\".  She has gotten injections in the past and wants to get those repeated.  She also mentions that she experiences occasional instability of the knee and inquired about a possible brace.    Her portals are healed.  No significant tenderness.  Motion is nearly full relative to the other side.  She still lacks a few degrees in all planes but has full functional motion.  There is no discomfort with mid-arc elevation.  She still has weakness with elevation, abduction, and external rotation.  Mild discomfort with resistive testing of her rotator cuff.    The left knee is examined.  Skin is benign.  Moderate medial tenderness.  Varus alignment.  Gait is very antalgic, favoring the left side.    Previous x-rays from April 27, 2016 are reviewed.  The x-rays show severe medial and patellofemoral compartment osteoarthritis.    Assessment: Six-month status post left rotator cuff repair, left knee osteoarthritis    Plan: I am concerned about the possibility of a re-tear.  It could be that this is just taking her longer than the average patient but I feel that further workup is indicated.  I had a long conversation with her and her  about this.  She wants to try giving the therapy a bit more time.  I demonstrated some exercises for her to work on and have encouraged her to resume the therapy focusing on strengthening.  I will see her back in another 6 weeks.    I agreed to repeat the injection for her left knee.  The risks, benefits, and " alternatives were discussed and she consented.  We'll also have Jack fit her with a off  brace.  She can follow-up as needed for the knee.    Large Joint Arthrocentesis  Date/Time: 4/14/2017 3:14 PM  Consent given by: patient  Site marked: site marked  Timeout: Immediately prior to procedure a time out was called to verify the correct patient, procedure, equipment, support staff and site/side marked as required   Supporting Documentation  Indications: pain and joint swelling   Procedure Details  Location: knee - L knee  Preparation: Patient was prepped and draped in the usual sterile fashion  Needle size: 25 G  Approach: anterolateral  Medications administered: 2 mL lidocaine 1 %; 160 mg methylPREDNISolone acetate 80 MG/ML; 2 mL bupivacaine  Patient tolerance: patient tolerated the procedure well with no immediate complications            William Bowman MD  04/14/2017

## 2017-11-10 ENCOUNTER — OFFICE VISIT (OUTPATIENT)
Dept: ORTHOPEDIC SURGERY | Facility: CLINIC | Age: 52
End: 2017-11-10

## 2017-11-10 VITALS — WEIGHT: 218 LBS | HEIGHT: 68 IN | BODY MASS INDEX: 33.04 KG/M2 | TEMPERATURE: 98.7 F

## 2017-11-10 DIAGNOSIS — M25.562 CHRONIC PAIN OF BOTH KNEES: Primary | ICD-10-CM

## 2017-11-10 DIAGNOSIS — M17.0 BILATERAL PRIMARY OSTEOARTHRITIS OF KNEE: ICD-10-CM

## 2017-11-10 DIAGNOSIS — M25.561 CHRONIC PAIN OF BOTH KNEES: Primary | ICD-10-CM

## 2017-11-10 DIAGNOSIS — G89.29 CHRONIC PAIN OF BOTH KNEES: Primary | ICD-10-CM

## 2017-11-10 PROCEDURE — 73562 X-RAY EXAM OF KNEE 3: CPT | Performed by: ORTHOPAEDIC SURGERY

## 2017-11-10 PROCEDURE — 20610 DRAIN/INJ JOINT/BURSA W/O US: CPT | Performed by: ORTHOPAEDIC SURGERY

## 2017-11-10 RX ADMIN — BUPIVACAINE HYDROCHLORIDE 2 ML: 5 INJECTION, SOLUTION PERINEURAL at 10:52

## 2017-11-10 RX ADMIN — METHYLPREDNISOLONE ACETATE 160 MG: 80 INJECTION, SUSPENSION INTRA-ARTICULAR; INTRALESIONAL; INTRAMUSCULAR; SOFT TISSUE at 10:52

## 2017-11-10 RX ADMIN — LIDOCAINE HYDROCHLORIDE 2 ML: 10 INJECTION, SOLUTION INFILTRATION; PERINEURAL at 10:52

## 2017-11-12 RX ORDER — LIDOCAINE HYDROCHLORIDE 10 MG/ML
2 INJECTION, SOLUTION INFILTRATION; PERINEURAL
Status: COMPLETED | OUTPATIENT
Start: 2017-11-10 | End: 2017-11-10

## 2017-11-12 RX ORDER — METHYLPREDNISOLONE ACETATE 80 MG/ML
160 INJECTION, SUSPENSION INTRA-ARTICULAR; INTRALESIONAL; INTRAMUSCULAR; SOFT TISSUE
Status: COMPLETED | OUTPATIENT
Start: 2017-11-10 | End: 2017-11-10

## 2017-11-12 RX ORDER — BUPIVACAINE HYDROCHLORIDE 5 MG/ML
2 INJECTION, SOLUTION PERINEURAL
Status: COMPLETED | OUTPATIENT
Start: 2017-11-10 | End: 2017-11-10

## 2017-11-12 NOTE — PROGRESS NOTES
Ms. Raygoza comes in today for follow-up of both knees.  The injections have worked well for her in the past and she wants to get those repeated.  Unfortunately, she feels like her symptoms are getting worse though and she feels that she is headed towards an arthroplasty in the short-term.    Both knees are briefly examined.  She has moderate to severe bilateral medial compartment tenderness.  Mild tenderness over the patellofemoral retinaculum bilaterally as well as.  Motion is still well preserved.  Gait is antalgic.    Bilateral AP, lateral and merchant views of the knees are ordered and reviewed.  These are compared to previous x-rays.  She has what appears to be moderate medial compartment osteophyte arthritis bilaterally with severe patellofemoral degenerative changes.    Assessment/plan: Bilateral knee osteoarthritis    We discussed options for her.  I recommend that we try repeating the injections.  The risks, benefits, and alternatives were discussed.  She consented.  Going forward, I will wait to hear from her.  If the injections fail to provide her significant relief, we may have to revisit the option of an arthroplasty.    Large Joint Arthrocentesis  Date/Time: 11/10/2017 10:52 AM  Consent given by: patient  Site marked: site marked  Timeout: Immediately prior to procedure a time out was called to verify the correct patient, procedure, equipment, support staff and site/side marked as required   Supporting Documentation  Indications: pain and joint swelling   Procedure Details  Location: knee - R knee  Preparation: Patient was prepped and draped in the usual sterile fashion  Needle size: 25 G (21 G)  Approach: anterolateral  Medications administered: 2 mL lidocaine 1 %; 160 mg methylPREDNISolone acetate 80 MG/ML; 2 mL bupivacaine 0.5 %  Patient tolerance: patient tolerated the procedure well with no immediate complications    Large Joint Arthrocentesis  Date/Time: 11/10/2017 10:52 AM  Consent given by:  patient  Site marked: site marked  Timeout: Immediately prior to procedure a time out was called to verify the correct patient, procedure, equipment, support staff and site/side marked as required   Supporting Documentation  Indications: pain and joint swelling   Procedure Details  Location: knee - L knee  Preparation: Patient was prepped and draped in the usual sterile fashion  Needle size: 25 G  Approach: anterolateral  Medications administered: 2 mL lidocaine 1 %; 160 mg methylPREDNISolone acetate 80 MG/ML; 2 mL bupivacaine 0.5 %  Patient tolerance: patient tolerated the procedure well with no immediate complications

## 2018-02-23 ENCOUNTER — OFFICE VISIT (OUTPATIENT)
Dept: ORTHOPEDIC SURGERY | Facility: CLINIC | Age: 53
End: 2018-02-23

## 2018-02-23 DIAGNOSIS — M17.0 BILATERAL PRIMARY OSTEOARTHRITIS OF KNEE: Primary | ICD-10-CM

## 2018-02-23 PROCEDURE — 20610 DRAIN/INJ JOINT/BURSA W/O US: CPT | Performed by: ORTHOPAEDIC SURGERY

## 2018-02-23 RX ORDER — MELATONIN
1000 2 TIMES DAILY
COMMUNITY

## 2018-02-23 RX ADMIN — METHYLPREDNISOLONE ACETATE 160 MG: 80 INJECTION, SUSPENSION INTRA-ARTICULAR; INTRALESIONAL; INTRAMUSCULAR; SOFT TISSUE at 13:15

## 2018-02-23 RX ADMIN — LIDOCAINE HYDROCHLORIDE 2 ML: 10 INJECTION, SOLUTION INFILTRATION; PERINEURAL at 13:15

## 2018-02-25 RX ORDER — METHYLPREDNISOLONE ACETATE 80 MG/ML
160 INJECTION, SUSPENSION INTRA-ARTICULAR; INTRALESIONAL; INTRAMUSCULAR; SOFT TISSUE
Status: COMPLETED | OUTPATIENT
Start: 2018-02-23 | End: 2018-02-23

## 2018-02-25 RX ORDER — LIDOCAINE HYDROCHLORIDE 10 MG/ML
2 INJECTION, SOLUTION INFILTRATION; PERINEURAL
Status: COMPLETED | OUTPATIENT
Start: 2018-02-23 | End: 2018-02-23

## 2018-02-25 NOTE — PROGRESS NOTES
Korina Raygoza     : 1965     MRN: [id number]     DATE: 2018    CC:   Bilateral knee osteoarthritis    SUBJECTIVE:  Patient returns today for follow up regarding bilateral knee osteoarthritis.  Continues to complain of pain and limited mobility.  Symptoms have responded well to injections in the past.     OBJECTIVE:    There were no vitals taken for this visit.    Exam:.     General:  Patient is awake and alert.  No acute distress.  Affect and demeanor appropriate.    Extremities:  Skin about knees is benign.  Motion is limited and uncomfortable.    DIAGNOSTIC STUDIES    Xrays:  None taken    ASSESSMENT:  Bilateral knee osteoarthritis    PLAN:  We discussed treatment options in detail.  Given the previous success with injections, I recommended that we repeat those.  The risks, benefits, and alternatives were discussed and the patient consented to proceed.    Large Joint Arthrocentesis  Date/Time: 2018 1:15 PM  Consent given by: patient  Site marked: site marked  Timeout: Immediately prior to procedure a time out was called to verify the correct patient, procedure, equipment, support staff and site/side marked as required   Supporting Documentation  Indications: pain and joint swelling   Procedure Details  Location: knee - R knee  Preparation: Patient was prepped and draped in the usual sterile fashion  Needle size: 25 G (21 G)  Approach: anterolateral  Medications administered: 2 mL lidocaine 1 %; 160 mg methylPREDNISolone acetate 80 MG/ML  Patient tolerance: patient tolerated the procedure well with no immediate complications    Large Joint Arthrocentesis  Date/Time: 2018 1:15 PM  Consent given by: patient  Site marked: site marked  Timeout: Immediately prior to procedure a time out was called to verify the correct patient, procedure, equipment, support staff and site/side marked as required   Supporting Documentation  Indications: pain and joint swelling   Procedure Details  Location: knee - L  knee  Preparation: Patient was prepped and draped in the usual sterile fashion  Needle size: 25 G  Approach: anterolateral  Medications administered: 2 mL lidocaine 1 %; 160 mg methylPREDNISolone acetate 80 MG/ML  Patient tolerance: patient tolerated the procedure well with no immediate complications

## 2018-12-07 ENCOUNTER — OFFICE VISIT (OUTPATIENT)
Dept: ORTHOPEDIC SURGERY | Facility: CLINIC | Age: 53
End: 2018-12-07

## 2018-12-07 VITALS — HEIGHT: 68 IN | BODY MASS INDEX: 31.07 KG/M2 | TEMPERATURE: 98.6 F | WEIGHT: 205 LBS

## 2018-12-07 DIAGNOSIS — M25.561 CHRONIC PAIN OF BOTH KNEES: Primary | ICD-10-CM

## 2018-12-07 DIAGNOSIS — M17.10 ARTHRITIS OF KNEE: ICD-10-CM

## 2018-12-07 DIAGNOSIS — M25.562 CHRONIC PAIN OF BOTH KNEES: Primary | ICD-10-CM

## 2018-12-07 DIAGNOSIS — G89.29 CHRONIC PAIN OF BOTH KNEES: Primary | ICD-10-CM

## 2018-12-07 PROCEDURE — 20610 DRAIN/INJ JOINT/BURSA W/O US: CPT | Performed by: ORTHOPAEDIC SURGERY

## 2018-12-07 PROCEDURE — 73562 X-RAY EXAM OF KNEE 3: CPT | Performed by: ORTHOPAEDIC SURGERY

## 2018-12-07 RX ORDER — METHYLPREDNISOLONE ACETATE 80 MG/ML
80 INJECTION, SUSPENSION INTRA-ARTICULAR; INTRALESIONAL; INTRAMUSCULAR; SOFT TISSUE
Status: COMPLETED | OUTPATIENT
Start: 2018-12-07 | End: 2018-12-07

## 2018-12-07 RX ORDER — LIDOCAINE HYDROCHLORIDE 10 MG/ML
2 INJECTION, SOLUTION EPIDURAL; INFILTRATION; INTRACAUDAL; PERINEURAL
Status: COMPLETED | OUTPATIENT
Start: 2018-12-07 | End: 2018-12-07

## 2018-12-07 RX ADMIN — METHYLPREDNISOLONE ACETATE 80 MG: 80 INJECTION, SUSPENSION INTRA-ARTICULAR; INTRALESIONAL; INTRAMUSCULAR; SOFT TISSUE at 09:38

## 2018-12-07 RX ADMIN — LIDOCAINE HYDROCHLORIDE 2 ML: 10 INJECTION, SOLUTION EPIDURAL; INFILTRATION; INTRACAUDAL; PERINEURAL at 09:38

## 2018-12-07 NOTE — PROGRESS NOTES
Ms. Raygoza comes in for follow-up of both knees.  She would like to get her injections repeated.  Repeat AP, merchants and lateral views of both knees are ordered and reviewed today.  These are compared to previous x-rays.  She has tricompartment osteophyte arthritis bilaterally.  The worst of her arthritis appears to be patellofemoral.  There is diffuse joint space narrowing, osteophyte formation and subchondral sclerosis.  The risks, benefits and alternatives to the injections were discussed.  She consented.  She will follow-up as needed.    Large Joint Arthrocentesis: R knee  Date/Time: 12/7/2018 9:38 AM  Consent given by: patient  Site marked: site marked  Timeout: Immediately prior to procedure a time out was called to verify the correct patient, procedure, equipment, support staff and site/side marked as required   Supporting Documentation  Indications: pain and joint swelling   Procedure Details  Location: knee - R knee  Preparation: Patient was prepped and draped in the usual sterile fashion  Needle gauge: 21 G.  Approach: anterolateral  Medications administered: 80 mg methylPREDNISolone acetate 80 MG/ML; 2 mL lidocaine PF 1% 1 %  Patient tolerance: patient tolerated the procedure well with no immediate complications    Large Joint Arthrocentesis: L knee  Date/Time: 12/7/2018 9:38 AM  Consent given by: patient  Site marked: site marked  Timeout: Immediately prior to procedure a time out was called to verify the correct patient, procedure, equipment, support staff and site/side marked as required   Supporting Documentation  Indications: pain and joint swelling   Procedure Details  Location: knee - L knee  Preparation: Patient was prepped and draped in the usual sterile fashion  Needle gauge: 21 G.  Approach: anterolateral  Medications administered: 80 mg methylPREDNISolone acetate 80 MG/ML; 2 mL lidocaine PF 1% 1 %  Patient tolerance: patient tolerated the procedure well with no immediate  complications

## 2021-03-30 ENCOUNTER — BULK ORDERING (OUTPATIENT)
Dept: CASE MANAGEMENT | Facility: OTHER | Age: 56
End: 2021-03-30

## 2021-03-30 DIAGNOSIS — Z23 IMMUNIZATION DUE: ICD-10-CM

## 2021-06-23 ENCOUNTER — TRANSCRIBE ORDERS (OUTPATIENT)
Dept: ADMINISTRATIVE | Facility: HOSPITAL | Age: 56
End: 2021-06-23

## 2021-06-23 ENCOUNTER — HOSPITAL ENCOUNTER (OUTPATIENT)
Dept: GENERAL RADIOLOGY | Facility: HOSPITAL | Age: 56
Discharge: HOME | End: 2021-06-23

## 2021-06-23 ENCOUNTER — LAB (OUTPATIENT)
Dept: LAB | Facility: HOSPITAL | Age: 56
End: 2021-06-23

## 2021-06-23 ENCOUNTER — HOSPITAL ENCOUNTER (OUTPATIENT)
Dept: CARDIOLOGY | Facility: HOSPITAL | Age: 56
Discharge: HOME | End: 2021-06-23

## 2021-06-23 DIAGNOSIS — Z01.818 PRE-OP EXAM: Primary | ICD-10-CM

## 2021-06-23 DIAGNOSIS — Z01.818 PRE-OP EXAM: ICD-10-CM

## 2021-06-23 DIAGNOSIS — M25.50 ARTHRALGIA, UNSPECIFIED JOINT: ICD-10-CM

## 2021-06-23 LAB
ALBUMIN SERPL-MCNC: 4.1 G/DL (ref 3.5–5.2)
ALBUMIN/GLOB SERPL: 1.6 G/DL
ALP SERPL-CCNC: 99 U/L (ref 39–117)
ALT SERPL W P-5'-P-CCNC: 24 U/L (ref 1–33)
ANION GAP SERPL CALCULATED.3IONS-SCNC: 7.6 MMOL/L (ref 5–15)
AST SERPL-CCNC: 21 U/L (ref 1–32)
BASOPHILS # BLD AUTO: 0.05 10*3/MM3 (ref 0–0.2)
BASOPHILS NFR BLD AUTO: 0.6 % (ref 0–1.5)
BILIRUB SERPL-MCNC: 0.2 MG/DL (ref 0–1.2)
BILIRUB UR QL STRIP: NEGATIVE
BUN SERPL-MCNC: 14 MG/DL (ref 6–20)
BUN/CREAT SERPL: 23 (ref 7–25)
CALCIUM SPEC-SCNC: 8.8 MG/DL (ref 8.6–10.5)
CHLORIDE SERPL-SCNC: 103 MMOL/L (ref 98–107)
CLARITY UR: CLEAR
CO2 SERPL-SCNC: 26.4 MMOL/L (ref 22–29)
COLOR UR: YELLOW
CREAT SERPL-MCNC: 0.61 MG/DL (ref 0.57–1)
DEPRECATED RDW RBC AUTO: 42.7 FL (ref 37–54)
EOSINOPHIL # BLD AUTO: 0.33 10*3/MM3 (ref 0–0.4)
EOSINOPHIL NFR BLD AUTO: 4 % (ref 0.3–6.2)
ERYTHROCYTE [DISTWIDTH] IN BLOOD BY AUTOMATED COUNT: 14.5 % (ref 12.3–15.4)
GFR SERPL CREATININE-BSD FRML MDRD: 103 ML/MIN/1.73
GLOBULIN UR ELPH-MCNC: 2.6 GM/DL
GLUCOSE SERPL-MCNC: 85 MG/DL (ref 65–99)
GLUCOSE UR STRIP-MCNC: NEGATIVE MG/DL
HCT VFR BLD AUTO: 35.9 % (ref 34–46.6)
HGB BLD-MCNC: 11.4 G/DL (ref 12–15.9)
HGB UR QL STRIP.AUTO: NEGATIVE
IMM GRANULOCYTES # BLD AUTO: 0.02 10*3/MM3 (ref 0–0.05)
IMM GRANULOCYTES NFR BLD AUTO: 0.2 % (ref 0–0.5)
INR PPP: 0.95 (ref 0.9–1.1)
KETONES UR QL STRIP: NEGATIVE
LEUKOCYTE ESTERASE UR QL STRIP.AUTO: NEGATIVE
LYMPHOCYTES # BLD AUTO: 2.22 10*3/MM3 (ref 0.7–3.1)
LYMPHOCYTES NFR BLD AUTO: 27.2 % (ref 19.6–45.3)
MCH RBC QN AUTO: 25.6 PG (ref 26.6–33)
MCHC RBC AUTO-ENTMCNC: 31.8 G/DL (ref 31.5–35.7)
MCV RBC AUTO: 80.7 FL (ref 79–97)
MONOCYTES # BLD AUTO: 0.65 10*3/MM3 (ref 0.1–0.9)
MONOCYTES NFR BLD AUTO: 8 % (ref 5–12)
NEUTROPHILS NFR BLD AUTO: 4.89 10*3/MM3 (ref 1.7–7)
NEUTROPHILS NFR BLD AUTO: 60 % (ref 42.7–76)
NITRITE UR QL STRIP: NEGATIVE
NRBC BLD AUTO-RTO: 0 /100 WBC (ref 0–0.2)
PH UR STRIP.AUTO: 5.5 [PH] (ref 5–8)
PLATELET # BLD AUTO: 374 10*3/MM3 (ref 140–450)
PMV BLD AUTO: 9.7 FL (ref 6–12)
POTASSIUM SERPL-SCNC: 4.3 MMOL/L (ref 3.5–5.2)
PROT SERPL-MCNC: 6.7 G/DL (ref 6–8.5)
PROT UR QL STRIP: NEGATIVE
PROTHROMBIN TIME: 12.5 SECONDS (ref 11.7–14.2)
QT INTERVAL: 376 MS
RBC # BLD AUTO: 4.45 10*6/MM3 (ref 3.77–5.28)
SODIUM SERPL-SCNC: 137 MMOL/L (ref 136–145)
SP GR UR STRIP: 1.02 (ref 1–1.03)
UROBILINOGEN UR QL STRIP: NORMAL
WBC # BLD AUTO: 8.16 10*3/MM3 (ref 3.4–10.8)

## 2021-06-23 PROCEDURE — 71046 X-RAY EXAM CHEST 2 VIEWS: CPT

## 2021-06-23 PROCEDURE — 93010 ELECTROCARDIOGRAM REPORT: CPT | Performed by: INTERNAL MEDICINE

## 2021-06-23 PROCEDURE — 80053 COMPREHEN METABOLIC PANEL: CPT

## 2021-06-23 PROCEDURE — 36415 COLL VENOUS BLD VENIPUNCTURE: CPT

## 2021-06-23 PROCEDURE — 81003 URINALYSIS AUTO W/O SCOPE: CPT

## 2021-06-23 PROCEDURE — 85025 COMPLETE CBC W/AUTO DIFF WBC: CPT

## 2021-06-23 PROCEDURE — 93005 ELECTROCARDIOGRAM TRACING: CPT | Performed by: ORTHOPAEDIC SURGERY

## 2021-06-23 PROCEDURE — 85610 PROTHROMBIN TIME: CPT

## 2021-09-01 ENCOUNTER — LAB (OUTPATIENT)
Dept: LAB | Facility: HOSPITAL | Age: 56
End: 2021-09-01

## 2021-09-01 ENCOUNTER — TRANSCRIBE ORDERS (OUTPATIENT)
Dept: ADMINISTRATIVE | Facility: HOSPITAL | Age: 56
End: 2021-09-01

## 2021-09-01 DIAGNOSIS — Z01.818 PRE-OP EXAM: ICD-10-CM

## 2021-09-01 DIAGNOSIS — Z01.818 PRE-OP EXAM: Primary | ICD-10-CM

## 2021-09-01 LAB
ANION GAP SERPL CALCULATED.3IONS-SCNC: 11.4 MMOL/L (ref 5–15)
APTT PPP: 32.7 SECONDS (ref 22.7–35.4)
BASOPHILS # BLD AUTO: 0.05 10*3/MM3 (ref 0–0.2)
BASOPHILS NFR BLD AUTO: 0.7 % (ref 0–1.5)
BILIRUB UR QL STRIP: NEGATIVE
BUN SERPL-MCNC: 12 MG/DL (ref 6–20)
BUN/CREAT SERPL: 18.2 (ref 7–25)
CALCIUM SPEC-SCNC: 9 MG/DL (ref 8.6–10.5)
CHLORIDE SERPL-SCNC: 101 MMOL/L (ref 98–107)
CLARITY UR: CLEAR
CO2 SERPL-SCNC: 24.6 MMOL/L (ref 22–29)
COLOR UR: YELLOW
CREAT SERPL-MCNC: 0.66 MG/DL (ref 0.57–1)
DEPRECATED RDW RBC AUTO: 50.5 FL (ref 37–54)
EOSINOPHIL # BLD AUTO: 0.4 10*3/MM3 (ref 0–0.4)
EOSINOPHIL NFR BLD AUTO: 5.9 % (ref 0.3–6.2)
ERYTHROCYTE [DISTWIDTH] IN BLOOD BY AUTOMATED COUNT: 16.1 % (ref 12.3–15.4)
GFR SERPL CREATININE-BSD FRML MDRD: 94 ML/MIN/1.73
GLUCOSE SERPL-MCNC: 79 MG/DL (ref 65–99)
GLUCOSE UR STRIP-MCNC: NEGATIVE MG/DL
HCT VFR BLD AUTO: 43.2 % (ref 34–46.6)
HGB BLD-MCNC: 13.9 G/DL (ref 12–15.9)
HGB UR QL STRIP.AUTO: NEGATIVE
IMM GRANULOCYTES # BLD AUTO: 0.01 10*3/MM3 (ref 0–0.05)
IMM GRANULOCYTES NFR BLD AUTO: 0.1 % (ref 0–0.5)
INR PPP: 0.98 (ref 0.9–1.1)
KETONES UR QL STRIP: NEGATIVE
LEUKOCYTE ESTERASE UR QL STRIP.AUTO: NEGATIVE
LYMPHOCYTES # BLD AUTO: 2.07 10*3/MM3 (ref 0.7–3.1)
LYMPHOCYTES NFR BLD AUTO: 30.6 % (ref 19.6–45.3)
MCH RBC QN AUTO: 28 PG (ref 26.6–33)
MCHC RBC AUTO-ENTMCNC: 32.2 G/DL (ref 31.5–35.7)
MCV RBC AUTO: 87.1 FL (ref 79–97)
MONOCYTES # BLD AUTO: 0.57 10*3/MM3 (ref 0.1–0.9)
MONOCYTES NFR BLD AUTO: 8.4 % (ref 5–12)
NEUTROPHILS NFR BLD AUTO: 3.66 10*3/MM3 (ref 1.7–7)
NEUTROPHILS NFR BLD AUTO: 54.3 % (ref 42.7–76)
NITRITE UR QL STRIP: NEGATIVE
NRBC BLD AUTO-RTO: 0 /100 WBC (ref 0–0.2)
PH UR STRIP.AUTO: 5.5 [PH] (ref 5–8)
PLATELET # BLD AUTO: 323 10*3/MM3 (ref 140–450)
PMV BLD AUTO: 9.5 FL (ref 6–12)
POTASSIUM SERPL-SCNC: 3.8 MMOL/L (ref 3.5–5.2)
PROT UR QL STRIP: NEGATIVE
PROTHROMBIN TIME: 12.8 SECONDS (ref 11.7–14.2)
RBC # BLD AUTO: 4.96 10*6/MM3 (ref 3.77–5.28)
SODIUM SERPL-SCNC: 137 MMOL/L (ref 136–145)
SP GR UR STRIP: 1.02 (ref 1–1.03)
UROBILINOGEN UR QL STRIP: NORMAL
WBC # BLD AUTO: 6.76 10*3/MM3 (ref 3.4–10.8)

## 2021-09-01 PROCEDURE — 81003 URINALYSIS AUTO W/O SCOPE: CPT

## 2021-09-01 PROCEDURE — 36415 COLL VENOUS BLD VENIPUNCTURE: CPT

## 2021-09-01 PROCEDURE — 85025 COMPLETE CBC W/AUTO DIFF WBC: CPT

## 2021-09-01 PROCEDURE — 85610 PROTHROMBIN TIME: CPT

## 2021-09-01 PROCEDURE — 80048 BASIC METABOLIC PNL TOTAL CA: CPT

## 2021-09-01 PROCEDURE — 85730 THROMBOPLASTIN TIME PARTIAL: CPT

## 2023-02-23 NOTE — PROGRESS NOTES
Outpatient Physical Therapy Ortho Treatment Note  [facility]     Patient Name: Korina Raygoza  : 1965  MRN: [id number]  Today's Date: 2017      Visit Date: 2017    Visit Dx:    ICD-10-CM ICD-9-CM   1. Orthopedic aftercare Z47.89 V54.9   2. S/P rotator cuff repair Z98.890 V45.89   3. Impaired mobility Z74.09 799.89   4. Knee stiffness, left M25.662 719.56   5. Limitation of joint motion of knee, right M25.661 719.56   6. Primary osteoarthritis of both knees M17.0 715.16       Patient Active Problem List   Diagnosis   • Tear of medial meniscus of knee   • Osteoarthrosis, localized, primary, knee   • Knee pain, bilateral   • Tendonitis, Achilles, right   • Rotator cuff tendinitis   • Elbow laceration   • Complete tear of left rotator cuff        Past Medical History   Diagnosis Date   • Arthritis      KNEES   • Asthma    • History of herpes zoster    • Meniscus tear      LEFT - CURRENT   • Rosacea    • Rotator cuff tear         Past Surgical History   Procedure Laterality Date   • Mole removal  2013   • Knee surgery     • Shoulder arthroscopy w/ rotator cuff repair Left 10/18/2016     Procedure: SHOULDER ARTHROSCOPY WITH ROTATOR CUFF REPAIR AND LABRAL DEBRIDEMENT;  Surgeon: William Bowman MD;  Location: [facility] OR [facility];  Service:                              PT Assessment/Plan       17 0841 17 0850       PT Assessment    Assessment Comments Pain is intermittent. Improving sleeping with decreased interuptions. Strength continue to improve.   -WS Patient was able to rake leaves over the weekend without increased pain/symptoms. Strength and stability continue to improve.   -       User Key  (r) = Recorded By, (t) = Taken By, (c) = Cosigned By    Initials Name Provider Type    ABISAI Chairez PTA Physical Therapy Assistant                Modalities       17 0800          Ice    Ice Applied Yes  -WS      Location L shoulder, pt. seated, LUE rested on pillows in lose pack   -WS      Rx Minutes 10 mins  -WS      Ice S/P Rx Yes  -WS        User Key  (r) = Recorded By, (t) = Taken By, (c) = Cosigned By    Initials Name Provider Type    ABISAI Chairez PTA Physical Therapy Assistant                Exercises       02/01/17 0800          Subjective Comments    Subjective Comments Shoulder was a little sore after last treatment lasting 1 day. No pain  starting therapy today  -WS      Subjective Pain    Able to rate subjective pain? yes  -WS      Pre-Treatment Pain Level 0  -WS      Exercise 1    Exercise Name 1 shoulder shurgs  -WS      Cueing 1 Verbal  -WS      Equipment 1 Dumbell  -WS      Weights/Plates 1 3  -WS      Reps 1 20  -WS      Exercise 2    Exercise Name 2 scap retraction, no shoulder ext  -WS      Cueing 2 Verbal;Tactile  -WS      Equipment 2 Theraband  -WS      Resistance 2 Green  -WS      Reps 2 20  -WS      Exercise 3    Exercise Name 3 elbow flexion/ext ROM   -WS      Cueing 3 Verbal  -WS      Equipment 3 Dumbell  -WS      Weights/Plates 3 3  -WS      Reps 3 20  -WS      Exercise 7    Exercise Name 7 wash the wall, LUE  -WS      Cueing 7 Verbal  -WS      Sets 7 1  -WS      Reps 7 20  -WS      Exercise 8    Exercise Name 8 chest press, cane, supine  -WS      Cueing 8 Verbal  -WS      Equipment 8 Cuff Weight  -WS      Weights/Plates 8 5  -WS      Sets 8 2  -WS      Reps 8 10  -WS      Exercise 10    Exercise Name 10 FIR with towel  -WS      Cueing 10 Tactile  -WS      Sets 10 1  -WS      Reps 10 5  -WS      Time (Seconds) 10 10  -WS      Exercise 11    Exercise Name 11 prone shoulder ext  -WS      Cueing 11 Verbal  -WS      Equipment 11 Dumbell  -WS      Weights/Plates 11 2  -WS      Resistance 11 --  -WS      Sets 11 2  -WS      Reps 11 10  -WS      Exercise 12    Exercise Name 12 supine clock, horizontal/diagonals   -WS      Cueing 12 Verbal  -WS      Equipment 12 Theraband  -WS      Resistance 12 Red  -WS      Sets 12 2  -WS      Reps 12 10  -WS      Exercise 13     Exercise Name 13 SL ER, towel roll   -WS      Cueing 13 Tactile  -WS      Equipment 13 Dumbell  -WS      Weights/Plates 13 2  -WS      Sets 13 2  -WS      Reps 13 10  -WS      Exercise 14    Exercise Name 14 prone row to neutral  -WS      Cueing 14 Tactile  -WS      Equipment 14 Dumbell  -WS      Weights/Plates 14 2  -WS      Sets 14 2  -WS      Reps 14 10  -WS      Time (Seconds) 14 --  -WS      Exercise 15    Exercise Name 15 walk the wall  -WS      Cueing 15 Demo  -WS      Resistance 15 Yellow  -WS      Sets 15 2  -WS      Reps 15 5  -WS        User Key  (r) = Recorded By, (t) = Taken By, (c) = Cosigned By    Initials Name Provider Type    WS José Miguel Chairez PTA Physical Therapy Assistant                        Manual Rx (last 36 hours)      Manual Treatments       02/01/17 0840          Manual Rx 1    Manual Rx 1 Location gentle inferior mobs L shoulder/oscillations for relaxation, pt. supine  -WS      Manual Rx 2    Manual Rx 2 Location PROM L shoulder, pt. supine, into flexion, abduction, ER, IR   -WS      Manual Rx 3    Manual Rx 3 Location slow reversal L ER/IR, 90 abd, POS, supine   -WS      Manual Rx 4    Manual Rx 4 Location slow reversal L shoulder D2, supine   -WS        User Key  (r) = Recorded By, (t) = Taken By, (c) = Cosigned By    Initials Name Provider Type     José Miguel Chairez PTA Physical Therapy Assistant                PT OP Goals       02/01/17 0800 01/23/17 1000       PT Short Term Goals    STG Date to Achieve 11/29/16  -WS 11/29/16  -GJ     STG 1 pt. to be I with initial HEP to facilitate self management of their condition  -WS pt. to be I with initial HEP to facilitate self management of their condition  -GJ     STG 1 Progress Met  -WS Met  -GJ     STG 2 pt. to be educated in/verbalize understanding of the importance of posture/ergonomics in association with their condition to facilitate self management of their condition  -WS pt. to be educated in/verbalize understanding of the  importance of posture/ergonomics in association with their condition to facilitate self management of their condition  -GJ     STG 2 Progress Met  -WS Met  -GJ     STG 3 pt. to demonstrate L shoulder flexion PROM >/= 0-120 to facilitate ease of performing self care activities  -WS pt. to demonstrate L shoulder flexion PROM >/= 0-120 to facilitate ease of performing self care activities  -GJ     STG 3 Progress Met  -WS Met  -GJ     STG 4 pt. to demonstrate L shoulder PROM ER >/= 0-45 to facilitate ease of performing self care activities  -WS pt. to demonstrate L shoulder PROM ER >/= 0-45 to facilitate ease of performing self care activities  -GJ     STG 4 Progress Met  -WS Met  -GJ     STG 5 pt. to report sleeping through the night without interruption secondary to her L shoulder pain to facilitate optimal healing   -WS pt. to report sleeping through the night without interruption secondary to her L shoulder pain to facilitate optimal healing   -GJ     STG 5 Progress Partially Met  -WS Ongoing  -GJ     STG 5 Progress Comments  having trouble sleeping  -     Long Term Goals    LTG Date to Achieve 02/13/17  -WS 02/13/17  -GJ     LTG 1 pt. to be I with advanced HEP to facilitate self management of their condition  -WS pt. to be I with advanced HEP to facilitate self management of their condition  -GJ     LTG 1 Progress Ongoing  -WS Ongoing  -GJ     LTG 2 pt. to report a DASH </= 20% to demonstrate decreased level of perceived disability  -WS pt. to report a DASH </= 20% to demonstrate decreased level of perceived disability  -GJ     LTG 2 Progress Ongoing  -WS Ongoing  -GJ     LTG 2 Progress Comments  49%  -GJ     LTG 3 pt. to demonstrate L shoulder AROM FIR >/= mid line belt line to facilitate ease of performing self care/dressing activities  -WS pt. to demonstrate L shoulder AROM FIR >/= mid line belt line to facilitate ease of performing self care/dressing activities  -GJ     LTG 3 Progress Met  -WS Met  -GJ      LTG 4 pt. to demonstrate L shoulder flexion AROM >/= 0-120 to facilitate ease of performing functional/household activities  -WS pt. to demonstrate L shoulder flexion AROM >/= 0-120 to facilitate ease of performing functional/household activities  -GJ     LTG 4 Progress Partially Met  -WS Partially Met  -GJ     LTG 4 Progress Comments  some compensation  -GJ     LTG 5 pt. to demonstrate placing/retrieving small object from an above the shoulder level shelf with her LUE to facilitate ease of performing household/work related activities  -WS pt. to demonstrate placing/retrieving small object from an above the shoulder level shelf with her LUE to facilitate ease of performing household/work related activities  -GJ     LTG 5 Progress Ongoing  -WS Ongoing  -GJ     LTG 6 pt. to report initiating personal fitness program at [facility] to faciliate optimal carry over and improved overall health  -WS pt. to report initiating personal fitness program at [facility] to faciliate optimal carry over and improved overall health  -GJ     LTG 6 Progress Met  -WS Met  -GJ       User Key  (r) = Recorded By, (t) = Taken By, (c) = Cosigned By    Initials Name Provider Type     Gerard Henao, PT Physical Therapist    ABISAI Chairez PTA Physical Therapy Assistant                Therapy Education       02/01/17 0841    Therapy Education    Given HEP;Symptoms/condition management;Pain management;Posture/body mechanics  -WS    Program Reinforced  -WS    How Provided Verbal  -WS    Provided to Patient  -WS      User Key  (r) = Recorded By, (t) = Taken By, (c) = Cosigned By    Initials Name Provider Type    ABISAI Chairez PTA Physical Therapy Assistant                Time Calculation:   Start Time: 0800  Stop Time: 0900  Time Calculation (min): 60 min    Therapy Charges for Today     Code Description Service Date Service Provider Modifiers Qty    02108214562 HC PT THER PROC EA 15 MIN 2/1/2017 José Miguel Chairez PTA GP 2     33384969985 HC PT MANUAL THERAPY EA 15 MIN 2/1/2017 José Miguel Chairez, PTA GP 1    91585485392 HC PT HOT OR COLD PACK TREAT [facility] 2/1/2017 José Miguel Chairez PTA GP 1                    José Miguel Chairez PTA  2/1/2017      Discharged